# Patient Record
Sex: FEMALE | Race: WHITE | NOT HISPANIC OR LATINO | Employment: OTHER | URBAN - METROPOLITAN AREA
[De-identification: names, ages, dates, MRNs, and addresses within clinical notes are randomized per-mention and may not be internally consistent; named-entity substitution may affect disease eponyms.]

---

## 2017-02-01 ENCOUNTER — HOSPITAL ENCOUNTER (OUTPATIENT)
Dept: FAMILY MEDICINE CLINIC | Facility: CLINIC | Age: 57
Setting detail: SPECIMEN
Discharge: HOME | End: 2017-02-01
Attending: FAMILY MEDICINE | Admitting: FAMILY MEDICINE

## 2017-02-01 LAB
ALBUMIN SERPL-MCNC: 4.2 G/DL (ref 3.5–4.8)
ALBUMIN/GLOB SERPL: 1.5 {RATIO} (ref 1–1.7)
ALP SERPL-CCNC: 70 IU/L (ref 32–91)
ALT SERPL-CCNC: 35 IU/L (ref 14–54)
ANION GAP SERPL CALC-SCNC: 15.1 MMOL/L (ref 10–20)
AST SERPL-CCNC: 25 IU/L (ref 15–41)
BILIRUB SERPL-MCNC: 0.9 MG/DL (ref 0.3–1.2)
BUN SERPL-MCNC: 11 MG/DL (ref 8–20)
BUN/CREAT SERPL: 15.7 (ref 5.4–26.2)
CALCIUM SERPL-MCNC: 9.8 MG/DL (ref 8.9–10.3)
CHLORIDE SERPL-SCNC: 105 MMOL/L (ref 101–111)
CHOLEST SERPL-MCNC: 241 MG/DL
CHOLEST/HDLC SERPL: 5.8 {RATIO}
CONV CO2: 25 MMOL/L (ref 22–32)
CONV LDL CHOLESTEROL DIRECT: 171 MG/DL (ref 0–100)
CONV TOTAL PROTEIN: 7 G/DL (ref 6.1–7.9)
CREAT UR-MCNC: 0.7 MG/DL (ref 0.4–1)
GLOBULIN UR ELPH-MCNC: 2.8 G/DL (ref 2.5–3.8)
GLUCOSE SERPL-MCNC: 95 MG/DL (ref 65–99)
HDLC SERPL-MCNC: 42 MG/DL
LDLC/HDLC SERPL: 4.1 {RATIO}
LIPID INTERPRETATION: ABNORMAL
POTASSIUM SERPL-SCNC: 4.1 MMOL/L (ref 3.6–5.1)
SODIUM SERPL-SCNC: 141 MMOL/L (ref 136–144)
TRIGL SERPL-MCNC: 129 MG/DL
VLDLC SERPL CALC-MCNC: 28 MG/DL

## 2017-09-01 ENCOUNTER — HOSPITAL ENCOUNTER (OUTPATIENT)
Dept: FAMILY MEDICINE CLINIC | Facility: CLINIC | Age: 57
Setting detail: SPECIMEN
Discharge: HOME | End: 2017-09-01
Attending: FAMILY MEDICINE | Admitting: FAMILY MEDICINE

## 2018-03-30 ENCOUNTER — HOSPITAL ENCOUNTER (OUTPATIENT)
Dept: FAMILY MEDICINE CLINIC | Facility: CLINIC | Age: 58
Setting detail: SPECIMEN
Discharge: HOME | End: 2018-03-30
Attending: FAMILY MEDICINE | Admitting: FAMILY MEDICINE

## 2018-09-28 ENCOUNTER — HOSPITAL ENCOUNTER (OUTPATIENT)
Dept: FAMILY MEDICINE CLINIC | Facility: CLINIC | Age: 58
Setting detail: SPECIMEN
Discharge: HOME | End: 2018-09-28
Attending: FAMILY MEDICINE | Admitting: FAMILY MEDICINE

## 2018-09-28 LAB
ALBUMIN SERPL-MCNC: 4.1 G/DL (ref 3.5–4.8)
ALBUMIN/GLOB SERPL: 1.5 {RATIO} (ref 1–1.7)
ALP SERPL-CCNC: 88 IU/L (ref 32–91)
ALT SERPL-CCNC: 46 IU/L (ref 14–54)
ANION GAP SERPL CALC-SCNC: 12.7 MMOL/L (ref 10–20)
AST SERPL-CCNC: 34 IU/L (ref 15–41)
BILIRUB SERPL-MCNC: 0.9 MG/DL (ref 0.3–1.2)
BUN SERPL-MCNC: 14 MG/DL (ref 8–20)
BUN/CREAT SERPL: 23.3 (ref 5.4–26.2)
CALCIUM SERPL-MCNC: 9.5 MG/DL (ref 8.9–10.3)
CHLORIDE SERPL-SCNC: 107 MMOL/L (ref 101–111)
CHOLEST SERPL-MCNC: 187 MG/DL
CHOLEST/HDLC SERPL: 4 {RATIO}
CONV CO2: 25 MMOL/L (ref 22–32)
CONV LDL CHOLESTEROL DIRECT: 111 MG/DL (ref 0–100)
CONV TOTAL PROTEIN: 6.8 G/DL (ref 6.1–7.9)
CREAT UR-MCNC: 0.6 MG/DL (ref 0.4–1)
GLOBULIN UR ELPH-MCNC: 2.7 G/DL (ref 2.5–3.8)
GLUCOSE SERPL-MCNC: 95 MG/DL (ref 65–99)
HDLC SERPL-MCNC: 47 MG/DL
LDLC/HDLC SERPL: 2.4 {RATIO}
LIPID INTERPRETATION: ABNORMAL
POTASSIUM SERPL-SCNC: 3.7 MMOL/L (ref 3.6–5.1)
SODIUM SERPL-SCNC: 141 MMOL/L (ref 136–144)
TRIGL SERPL-MCNC: 182 MG/DL
VLDLC SERPL CALC-MCNC: 29 MG/DL

## 2019-04-05 ENCOUNTER — HOSPITAL ENCOUNTER (OUTPATIENT)
Dept: FAMILY MEDICINE CLINIC | Facility: CLINIC | Age: 59
Setting detail: SPECIMEN
Discharge: HOME | End: 2019-04-05
Attending: FAMILY MEDICINE | Admitting: FAMILY MEDICINE

## 2019-04-05 LAB
ALBUMIN SERPL-MCNC: 4 G/DL (ref 3.5–4.8)
ALBUMIN/GLOB SERPL: 1.4 {RATIO} (ref 1–1.7)
ALP SERPL-CCNC: 76 IU/L (ref 32–91)
ALT SERPL-CCNC: 40 IU/L (ref 14–54)
ANION GAP SERPL CALC-SCNC: 11.8 MMOL/L (ref 10–20)
AST SERPL-CCNC: 31 IU/L (ref 15–41)
BILIRUB SERPL-MCNC: 0.7 MG/DL (ref 0.3–1.2)
BUN SERPL-MCNC: 14 MG/DL (ref 8–20)
BUN/CREAT SERPL: 20 (ref 5.4–26.2)
CALCIUM SERPL-MCNC: 9.3 MG/DL (ref 8.9–10.3)
CHLORIDE SERPL-SCNC: 107 MMOL/L (ref 101–111)
CHOLEST SERPL-MCNC: 163 MG/DL
CHOLEST/HDLC SERPL: 4.2 {RATIO}
CONV CO2: 24 MMOL/L (ref 22–32)
CONV LDL CHOLESTEROL DIRECT: 93 MG/DL (ref 0–100)
CONV TOTAL PROTEIN: 6.9 G/DL (ref 6.1–7.9)
CREAT UR-MCNC: 0.7 MG/DL (ref 0.4–1)
GLOBULIN UR ELPH-MCNC: 2.9 G/DL (ref 2.5–3.8)
GLUCOSE SERPL-MCNC: 99 MG/DL (ref 65–99)
HDLC SERPL-MCNC: 39 MG/DL
LDLC/HDLC SERPL: 2.4 {RATIO}
LIPID INTERPRETATION: ABNORMAL
POTASSIUM SERPL-SCNC: 3.8 MMOL/L (ref 3.6–5.1)
SODIUM SERPL-SCNC: 139 MMOL/L (ref 136–144)
TRIGL SERPL-MCNC: 138 MG/DL
VLDLC SERPL CALC-MCNC: 31.1 MG/DL

## 2019-09-26 RX ORDER — AMLODIPINE BESYLATE 5 MG/1
TABLET ORAL
Qty: 90 TABLET | Refills: 1 | Status: SHIPPED | OUTPATIENT
Start: 2019-09-26 | End: 2020-04-03

## 2019-09-30 ENCOUNTER — TELEPHONE (OUTPATIENT)
Dept: FAMILY MEDICINE CLINIC | Facility: CLINIC | Age: 59
End: 2019-09-30

## 2019-09-30 RX ORDER — MELOXICAM 15 MG/1
15 TABLET ORAL DAILY
Qty: 90 TABLET | Refills: 1 | Status: SHIPPED | OUTPATIENT
Start: 2019-09-30 | End: 2019-11-06 | Stop reason: SDUPTHER

## 2019-11-06 ENCOUNTER — LAB (OUTPATIENT)
Dept: FAMILY MEDICINE CLINIC | Facility: CLINIC | Age: 59
End: 2019-11-06

## 2019-11-06 ENCOUNTER — OFFICE VISIT (OUTPATIENT)
Dept: FAMILY MEDICINE CLINIC | Facility: CLINIC | Age: 59
End: 2019-11-06

## 2019-11-06 VITALS
HEIGHT: 65 IN | HEART RATE: 87 BPM | DIASTOLIC BLOOD PRESSURE: 84 MMHG | OXYGEN SATURATION: 96 % | WEIGHT: 198 LBS | BODY MASS INDEX: 32.99 KG/M2 | SYSTOLIC BLOOD PRESSURE: 132 MMHG

## 2019-11-06 DIAGNOSIS — Z12.11 SCREENING FOR COLON CANCER: ICD-10-CM

## 2019-11-06 DIAGNOSIS — Z12.31 BREAST CANCER SCREENING BY MAMMOGRAM: ICD-10-CM

## 2019-11-06 DIAGNOSIS — Z12.4 PAP SMEAR FOR CERVICAL CANCER SCREENING: ICD-10-CM

## 2019-11-06 DIAGNOSIS — Z00.00 PREVENTATIVE HEALTH CARE: Primary | ICD-10-CM

## 2019-11-06 DIAGNOSIS — Z00.00 PREVENTATIVE HEALTH CARE: ICD-10-CM

## 2019-11-06 PROBLEM — I10 HYPERTENSION: Status: ACTIVE | Noted: 2019-11-06

## 2019-11-06 PROBLEM — M19.90 ARTHRITIS: Status: ACTIVE | Noted: 2019-11-06

## 2019-11-06 LAB
ALBUMIN SERPL-MCNC: 4.5 G/DL (ref 3.5–5.2)
ALBUMIN/GLOB SERPL: 1.6 G/DL
ALP SERPL-CCNC: 80 U/L (ref 39–117)
ALT SERPL W P-5'-P-CCNC: 30 U/L (ref 1–33)
ANION GAP SERPL CALCULATED.3IONS-SCNC: 11.7 MMOL/L (ref 5–15)
AST SERPL-CCNC: 17 U/L (ref 1–32)
BILIRUB BLD-MCNC: NEGATIVE MG/DL
BILIRUB SERPL-MCNC: 0.3 MG/DL (ref 0.2–1.2)
BUN BLD-MCNC: 12 MG/DL (ref 8–23)
BUN/CREAT SERPL: 18.5 (ref 7–25)
CALCIUM SPEC-SCNC: 9.9 MG/DL (ref 8.6–10.5)
CHLORIDE SERPL-SCNC: 101 MMOL/L (ref 98–107)
CHOLEST SERPL-MCNC: 272 MG/DL (ref 0–200)
CLARITY, POC: CLEAR
CO2 SERPL-SCNC: 26.3 MMOL/L (ref 22–29)
COLOR UR: YELLOW
CREAT BLD-MCNC: 0.65 MG/DL (ref 0.57–1)
GFR SERPL CREATININE-BSD FRML MDRD: 93 ML/MIN/1.73
GLOBULIN UR ELPH-MCNC: 2.9 GM/DL
GLUCOSE BLD-MCNC: 106 MG/DL (ref 65–99)
GLUCOSE UR STRIP-MCNC: NEGATIVE MG/DL
HBA1C MFR BLD: 5.2 % (ref 3.5–5.6)
HDLC SERPL-MCNC: 46 MG/DL (ref 40–60)
HEMOCCULT STL QL IA: NEGATIVE
KETONES UR QL: NEGATIVE
LDLC SERPL CALC-MCNC: 195 MG/DL (ref 0–100)
LDLC/HDLC SERPL: 4.23 {RATIO}
LEUKOCYTE EST, POC: NEGATIVE
NITRITE UR-MCNC: NEGATIVE MG/ML
PH UR: 6 [PH] (ref 5–8)
POTASSIUM BLD-SCNC: 4.3 MMOL/L (ref 3.5–5.2)
PROT SERPL-MCNC: 7.4 G/DL (ref 6–8.5)
PROT UR STRIP-MCNC: NEGATIVE MG/DL
RBC # UR STRIP: NEGATIVE /UL
SODIUM BLD-SCNC: 139 MMOL/L (ref 136–145)
SP GR UR: 1.01 (ref 1–1.03)
TRIGL SERPL-MCNC: 156 MG/DL (ref 0–150)
UROBILINOGEN UR QL: NORMAL
VLDLC SERPL-MCNC: 31.2 MG/DL (ref 5–40)

## 2019-11-06 PROCEDURE — 80061 LIPID PANEL: CPT | Performed by: FAMILY MEDICINE

## 2019-11-06 PROCEDURE — 80053 COMPREHEN METABOLIC PANEL: CPT | Performed by: FAMILY MEDICINE

## 2019-11-06 PROCEDURE — 81003 URINALYSIS AUTO W/O SCOPE: CPT | Performed by: FAMILY MEDICINE

## 2019-11-06 PROCEDURE — 36415 COLL VENOUS BLD VENIPUNCTURE: CPT | Performed by: FAMILY MEDICINE

## 2019-11-06 PROCEDURE — 99396 PREV VISIT EST AGE 40-64: CPT | Performed by: FAMILY MEDICINE

## 2019-11-06 PROCEDURE — 82274 ASSAY TEST FOR BLOOD FECAL: CPT | Performed by: FAMILY MEDICINE

## 2019-11-06 PROCEDURE — 83036 HEMOGLOBIN GLYCOSYLATED A1C: CPT | Performed by: FAMILY MEDICINE

## 2019-11-06 RX ORDER — VITAMIN B COMPLEX
CAPSULE ORAL DAILY
COMMUNITY
End: 2022-09-19

## 2019-11-06 RX ORDER — AMOXICILLIN AND CLAVULANATE POTASSIUM 875; 125 MG/1; MG/1
1 TABLET, FILM COATED ORAL 2 TIMES DAILY
Qty: 20 TABLET | Refills: 0 | Status: SHIPPED | OUTPATIENT
Start: 2019-11-06 | End: 2020-06-08 | Stop reason: SDUPTHER

## 2019-11-06 RX ORDER — MELOXICAM 15 MG/1
15 TABLET ORAL DAILY
Qty: 90 TABLET | Refills: 1 | Status: SHIPPED | OUTPATIENT
Start: 2019-11-06 | End: 2020-11-08

## 2019-11-06 NOTE — PROGRESS NOTES
Subjective   Rosalia Gamino is a 59 y.o. female.     She is here for cpe and pap smear  2 weeks of sinus pressure  Cough  Had flu exposure and symptoms and was treated at a [facility]  Refuses flu vaccine  UTD on tdap  Last colonoscopy was 2012  FH colon cancer so she is rec to do every five         The following portions of the patient's history were reviewed and updated as appropriate: allergies, current medications, past family history, past medical history, past social history, past surgical history and problem list.  Past Medical History:   Diagnosis Date   • Arthritis      Past Surgical History:   Procedure Laterality Date   • ROTATOR CUFF REPAIR Right      History reviewed. No pertinent family history.  Social History     Socioeconomic History   • Marital status:      Spouse name: Not on file   • Number of children: Not on file   • Years of education: Not on file   • Highest education level: Not on file   Tobacco Use   • Smoking status: Former Smoker   • Smokeless tobacco: Never Used   • Tobacco comment: occasionally         Current Outpatient Medications:   •  amLODIPine (NORVASC) 5 MG tablet, TAKE 1 TABLET BY MOUTH EVERY DAY FOR BLOOD PRESSURE, Disp: 90 tablet, Rfl: 1  •  B Complex Vitamins (VITAMIN B COMPLEX) capsule capsule, Take  by mouth Daily., Disp: , Rfl:   •  Calcium Carb-Cholecalciferol 1000-800 MG-UNIT tablet, CALCIUM + D3 TABS, Disp: , Rfl:   •  Lactobacillus Rhamnosus, GG, ([employer] PROBIOTIC, LACTOBACILLUS,) capsule, [employer]S PROBIOTIC (LACTOBACILLUS) CAPS, Disp: , Rfl:   •  Magnesium (M2 MAGNESIUM) 100 MG capsule, M2 MAGNESIUM 100 MG CAPS, Disp: , Rfl:   •  meloxicam (MOBIC) 15 MG tablet, Take 1 tablet by mouth Daily., Disp: 90 tablet, Rfl: 1  •  Milk Thistle-Turmeric (SILYMARIN PO), SILYMARIN CAPS, Disp: , Rfl:   •  MULTIPLE VITAMIN-FOLIC ACID PO, MULTIVITAMINS TABS, Disp: , Rfl:   •  amoxicillin-clavulanate (AUGMENTIN) 875-125 MG per tablet, Take 1 tablet by mouth 2 (Two) Times a Day., Disp: 20  "tablet, Rfl: 0  •  benzonatate (TESSALON) 200 MG capsule, 1 capsule p.o. every 8 hours as needed cough, Disp: 21 capsule, Rfl: 0    Review of Systems   Constitutional: Negative.    HENT: Positive for congestion and sinus pressure.    Eyes: Negative.    Respiratory: Positive for cough. Negative for apnea, choking, chest tightness, shortness of breath, wheezing and stridor.    Cardiovascular: Negative.    Gastrointestinal: Negative.    Endocrine: Negative.    Genitourinary: Negative.    Musculoskeletal: Positive for arthralgias.   Skin: Negative.    Allergic/Immunologic: Negative.    Neurological: Negative.    Hematological: Negative.    Psychiatric/Behavioral: Negative.      /84   Pulse 87   Ht 165.1 cm (65\")   Wt 89.8 kg (198 lb)   SpO2 96%   Breastfeeding? No   BMI 32.95 kg/m²       Objective   Physical Exam   Constitutional: She is oriented to person, place, and time. She appears well-developed and well-nourished. No distress. She is obese.  HENT:   Head: Normocephalic and atraumatic.   Right Ear: Hearing, tympanic membrane, external ear and ear canal normal.   Left Ear: Hearing, tympanic membrane, external ear and ear canal normal.   Nose: Nose normal. Right sinus exhibits no maxillary sinus tenderness and no frontal sinus tenderness. Left sinus exhibits no maxillary sinus tenderness and no frontal sinus tenderness.   Mouth/Throat: Uvula is midline, oropharynx is clear and moist and mucous membranes are normal.   Eyes: Conjunctivae, EOM and lids are normal. Pupils are equal, round, and reactive to light. Right eye exhibits no discharge. Left eye exhibits no discharge. No scleral icterus.   Neck: Trachea normal, full passive range of motion without pain and phonation normal. Neck supple. No JVD present. Carotid bruit is not present. No thyromegaly present.   Cardiovascular: Normal rate, regular rhythm, normal heart sounds, intact distal pulses and normal pulses. Exam reveals no gallop and no friction " rub.   No murmur heard.  Pulmonary/Chest: Effort normal and breath sounds normal. No respiratory distress. She has no wheezes. She has no rhonchi. She has no rales. Right breast exhibits no inverted nipple, no mass, no nipple discharge, no skin change and no tenderness. Left breast exhibits no inverted nipple, no mass, no nipple discharge, no skin change and no tenderness. Breasts are symmetrical. There is no breast swelling.   Abdominal: Soft. Bowel sounds are normal. She exhibits no mass. There is no hepatosplenomegaly. There is no tenderness. No hernia. Hernia confirmed negative in the right inguinal area and confirmed negative in the left inguinal area.   Genitourinary: Vagina normal and uterus normal. Rectal exam shows guaiac negative stool. Pelvic exam was performed with patient supine. There is no rash, tenderness, lesion, injury or Bartholin's cyst on the right labia. There is no rash, lesion, injury or Bartholin's cyst on the left labia.   Musculoskeletal: Normal range of motion. She exhibits no edema.   Lymphadenopathy:     She has no cervical adenopathy.     She has no axillary adenopathy.        Right: No inguinal and no supraclavicular adenopathy present.        Left: No inguinal and no supraclavicular adenopathy present.   Neurological: She is alert and oriented to person, place, and time. She has normal strength and normal reflexes. She displays normal reflexes. No cranial nerve deficit or sensory deficit. She exhibits normal muscle tone. Coordination normal.   Skin: Skin is warm and dry. Turgor is normal. No rash noted.   Psychiatric: She has a normal mood and affect. Her speech is normal and behavior is normal. Judgment and thought content normal. Cognition and memory are normal.   Nursing note and vitals reviewed.    Brief Urine Lab Results  (Last result in the past 365 days)      Color   Clarity   Blood   Leuk Est   Nitrite   Protein   CREAT   Urine HCG        11/06/19 1006 Yellow Clear Negative  Negative Negative Negative           Findings; occult blood negative/positive: negative    Assessment/Plan   Problems Addressed this Visit     None      Visit Diagnoses     Preventative health care    -  Primary    Relevant Orders    Comprehensive Metabolic Panel    Hemoglobin A1c    Lipid Panel    POCT urinalysis dipstick, automated (Completed)    Breast cancer screening by mammogram        Relevant Orders    Mammo Screening Digital Tomosynthesis Bilateral With CAD    Pap smear for cervical cancer screening        Relevant Orders    PapIG HPV Age Gdln ACOG    Screening for colon cancer        Relevant Orders    POC FECAL OCCULT BLOOD BY IMMUNOASSAY (Completed)          Counseled on the need for weight loss through increased exercise and improved diet  She is UTD on tdap; due colonoscopy; mammogram ordered  Encouraged shingriix  Pap smear sent  Has sinus infection - rx augmentin

## 2019-11-06 NOTE — PATIENT INSTRUCTIONS
SHINGRIX - 2 shot series - talk to your pharmacist  Keep working to lose weight through healthy eating and exercise.  Call when ready to schedule your colonoscopy  Extra rest and fluids   Limit dairy for a few days

## 2019-11-12 LAB
AGE GDLN ACOG TESTING: NORMAL
CYTOLOGIST CVX/VAG CYTO: NORMAL
CYTOLOGY CVX/VAG DOC CYTO: NORMAL
CYTOLOGY CVX/VAG DOC THIN PREP: NORMAL
DX ICD CODE: NORMAL
HIV 1 & 2 AB SER-IMP: NORMAL
HPV I/H RISK 4 DNA CVX QL PROBE+SIG AMP: NEGATIVE
OTHER STN SPEC: NORMAL
STAT OF ADQ CVX/VAG CYTO-IMP: NORMAL

## 2019-11-30 ENCOUNTER — HOSPITAL ENCOUNTER (OUTPATIENT)
Dept: CARDIOLOGY | Facility: HOSPITAL | Age: 59
Discharge: HOME | End: 2019-11-30

## 2019-11-30 ENCOUNTER — LAB (OUTPATIENT)
Dept: LAB | Facility: HOSPITAL | Age: 59
End: 2019-11-30

## 2019-11-30 ENCOUNTER — HOSPITAL ENCOUNTER (OUTPATIENT)
Dept: GENERAL RADIOLOGY | Facility: HOSPITAL | Age: 59
Discharge: HOME | End: 2019-11-30
Admitting: PODIATRIST

## 2019-11-30 ENCOUNTER — TRANSCRIBE ORDERS (OUTPATIENT)
Dept: ADMINISTRATIVE | Facility: HOSPITAL | Age: 59
End: 2019-11-30

## 2019-11-30 DIAGNOSIS — Z01.818 PRE-OP TESTING: Primary | ICD-10-CM

## 2019-11-30 DIAGNOSIS — Z01.818 PRE-OP TESTING: ICD-10-CM

## 2019-11-30 LAB
ANION GAP SERPL CALCULATED.3IONS-SCNC: 13.2 MMOL/L (ref 5–15)
BASOPHILS # BLD AUTO: 0.08 10*3/MM3 (ref 0–0.2)
BASOPHILS NFR BLD AUTO: 1 % (ref 0–1.5)
BUN BLD-MCNC: 14 MG/DL (ref 8–23)
BUN/CREAT SERPL: 21.2 (ref 7–25)
CALCIUM SPEC-SCNC: 9.8 MG/DL (ref 8.6–10.5)
CHLORIDE SERPL-SCNC: 107 MMOL/L (ref 98–107)
CO2 SERPL-SCNC: 24.8 MMOL/L (ref 22–29)
CREAT BLD-MCNC: 0.66 MG/DL (ref 0.57–1)
DEPRECATED RDW RBC AUTO: 44.2 FL (ref 37–54)
EOSINOPHIL # BLD AUTO: 0 10*3/MM3 (ref 0–0.4)
EOSINOPHIL NFR BLD AUTO: 0 % (ref 0.3–6.2)
ERYTHROCYTE [DISTWIDTH] IN BLOOD BY AUTOMATED COUNT: 12.6 % (ref 12.3–15.4)
GFR SERPL CREATININE-BSD FRML MDRD: 91 ML/MIN/1.73
GLUCOSE BLD-MCNC: 102 MG/DL (ref 65–99)
HCT VFR BLD AUTO: 42.5 % (ref 34–46.6)
HGB BLD-MCNC: 14.4 G/DL (ref 12–15.9)
IMM GRANULOCYTES # BLD AUTO: 0.04 10*3/MM3 (ref 0–0.05)
IMM GRANULOCYTES NFR BLD AUTO: 0.5 % (ref 0–0.5)
INR PPP: 1.04 (ref 0.9–1.1)
LYMPHOCYTES # BLD AUTO: 2.91 10*3/MM3 (ref 0.7–3.1)
LYMPHOCYTES NFR BLD AUTO: 34.6 % (ref 19.6–45.3)
MCH RBC QN AUTO: 32.3 PG (ref 26.6–33)
MCHC RBC AUTO-ENTMCNC: 33.9 G/DL (ref 31.5–35.7)
MCV RBC AUTO: 95.3 FL (ref 79–97)
MONOCYTES # BLD AUTO: 0.65 10*3/MM3 (ref 0.1–0.9)
MONOCYTES NFR BLD AUTO: 7.7 % (ref 5–12)
NEUTROPHILS # BLD AUTO: 4.73 10*3/MM3 (ref 1.7–7)
NEUTROPHILS NFR BLD AUTO: 56.2 % (ref 42.7–76)
NRBC BLD AUTO-RTO: 0 /100 WBC (ref 0–0.2)
PLATELET # BLD AUTO: 321 10*3/MM3 (ref 140–450)
PMV BLD AUTO: 10.3 FL (ref 6–12)
POTASSIUM BLD-SCNC: 4.6 MMOL/L (ref 3.5–5.2)
PROTHROMBIN TIME: 10.9 SECONDS (ref 9.6–11.7)
RBC # BLD AUTO: 4.46 10*6/MM3 (ref 3.77–5.28)
SODIUM BLD-SCNC: 145 MMOL/L (ref 136–145)
WBC NRBC COR # BLD: 8.41 10*3/MM3 (ref 3.4–10.8)

## 2019-11-30 PROCEDURE — 80048 BASIC METABOLIC PNL TOTAL CA: CPT

## 2019-11-30 PROCEDURE — 71046 X-RAY EXAM CHEST 2 VIEWS: CPT

## 2019-11-30 PROCEDURE — 85025 COMPLETE CBC W/AUTO DIFF WBC: CPT

## 2019-11-30 PROCEDURE — 85610 PROTHROMBIN TIME: CPT

## 2019-11-30 PROCEDURE — 36415 COLL VENOUS BLD VENIPUNCTURE: CPT

## 2019-11-30 PROCEDURE — 93005 ELECTROCARDIOGRAM TRACING: CPT | Performed by: PODIATRIST

## 2019-12-06 ENCOUNTER — LAB REQUISITION (OUTPATIENT)
Dept: LAB | Facility: HOSPITAL | Age: 59
End: 2019-12-06

## 2019-12-06 DIAGNOSIS — M19.071 PRIMARY OSTEOARTHRITIS, RIGHT ANKLE AND FOOT: ICD-10-CM

## 2019-12-06 DIAGNOSIS — D16.31 BENIGN NEOPLASM OF SHORT BONES OF RIGHT LOWER LIMB: ICD-10-CM

## 2019-12-06 PROCEDURE — 88305 TISSUE EXAM BY PATHOLOGIST: CPT | Performed by: PODIATRIST

## 2019-12-06 PROCEDURE — 88311 DECALCIFY TISSUE: CPT | Performed by: PODIATRIST

## 2019-12-08 PROCEDURE — 93010 ELECTROCARDIOGRAM REPORT: CPT | Performed by: INTERNAL MEDICINE

## 2019-12-09 LAB
LAB AP CASE REPORT: NORMAL
PATH REPORT.FINAL DX SPEC: NORMAL
PATH REPORT.GROSS SPEC: NORMAL

## 2020-04-03 RX ORDER — AMLODIPINE BESYLATE 5 MG/1
TABLET ORAL
Qty: 90 TABLET | Refills: 2 | Status: SHIPPED | OUTPATIENT
Start: 2020-04-03 | End: 2021-01-10

## 2020-06-08 ENCOUNTER — TELEPHONE (OUTPATIENT)
Dept: FAMILY MEDICINE CLINIC | Facility: CLINIC | Age: 60
End: 2020-06-08

## 2020-06-08 RX ORDER — AMOXICILLIN AND CLAVULANATE POTASSIUM 875; 125 MG/1; MG/1
1 TABLET, FILM COATED ORAL 2 TIMES DAILY
Qty: 20 TABLET | Refills: 0 | Status: SHIPPED | OUTPATIENT
Start: 2020-06-08 | End: 2021-01-07 | Stop reason: SDUPTHER

## 2020-06-08 NOTE — TELEPHONE ENCOUNTER
Pt requesting rx for her semi annual sinus infection. She has been taking otc allergy meds and it has not helped.

## 2020-09-18 ENCOUNTER — TELEPHONE (OUTPATIENT)
Dept: FAMILY MEDICINE CLINIC | Facility: CLINIC | Age: 60
End: 2020-09-18

## 2020-09-18 NOTE — TELEPHONE ENCOUNTER
Pt tried to make a cpe apt, but you are booked out until 2021. She had 1 day recently where her bp was 174/114. It has not happened since then, but she wants to know if she needs an rx or need to be seen.

## 2020-09-20 NOTE — PROGRESS NOTES
"Subjective   Rosalia Gamino is a 60 y.o. female.     Pt is here today with c/o HTN.  Pt typically sees Dr. Julian Glynn.  She reports that she has recently had on episode of an elevated BP.  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  Her  is also fighting liver cancer.  She had right ankle surgery in December and hasn't been able to exercise like she normally does.  She is currently on norvasc 5mg daily.  She believes that the elevation in BP is due to stress.  She does not have a machine at home.  Denies CP, SOA, dizziness, or HA.  She is not wanting to get on anything for her anxiety.  She does state that she is sensitive to caffeine and she has been drinking more caffeine.  She is hoping to retire soon.        The following portions of the patient's history were reviewed and updated as appropriate: allergies, current medications, past family history, past medical history, past social history, past surgical history and problem list.    Review of Systems   Constitutional: Negative for chills, fatigue and fever.   Eyes: Negative for blurred vision and double vision.   Respiratory: Negative for chest tightness and shortness of breath.    Cardiovascular: Negative for chest pain and palpitations.   Gastrointestinal: Negative for abdominal pain, constipation, diarrhea, nausea and vomiting.   Neurological: Negative for dizziness and headache.   Psychiatric/Behavioral: Negative for self-injury, suicidal ideas, depressed mood and stress. The patient is nervous/anxious.        Objective   /87   Pulse 77   Temp 97.3 °F (36.3 °C) (Temporal)   Ht 165.1 cm (65\")   Wt 88.5 kg (195 lb)   SpO2 96%   BMI 32.45 kg/m²   Physical Exam  Constitutional:       Appearance: Normal appearance.   HENT:      Head: Normocephalic and atraumatic.   Neck:      Musculoskeletal: Normal range of motion.   Cardiovascular:      Rate and Rhythm: Normal rate and regular rhythm.   Pulmonary: "      Effort: Pulmonary effort is normal.      Breath sounds: Normal breath sounds.   Musculoskeletal: Normal range of motion.   Neurological:      General: No focal deficit present.      Mental Status: She is alert and oriented to person, place, and time.   Psychiatric:         Mood and Affect: Mood normal.         Behavior: Behavior normal.         Thought Content: Thought content normal.         Judgment: Judgment normal.           Assessment/Plan     Diagnoses and all orders for this visit:    1. Hypertension, unspecified type (Primary)  Comments:  reading came down after sitting for a couple minutes.  no med changes at this time  cont [facility]  check labs  monitor BP at home  Orders:  -     Comprehensive Metabolic Panel; Future  -     CBC & Differential  -     Lipid Panel; Future

## 2020-09-22 ENCOUNTER — OFFICE VISIT (OUTPATIENT)
Dept: FAMILY MEDICINE CLINIC | Facility: CLINIC | Age: 60
End: 2020-09-22

## 2020-09-22 ENCOUNTER — LAB (OUTPATIENT)
Dept: LAB | Facility: HOSPITAL | Age: 60
End: 2020-09-22

## 2020-09-22 VITALS
HEART RATE: 77 BPM | DIASTOLIC BLOOD PRESSURE: 87 MMHG | OXYGEN SATURATION: 96 % | HEIGHT: 65 IN | TEMPERATURE: 97.3 F | WEIGHT: 195 LBS | BODY MASS INDEX: 32.49 KG/M2 | SYSTOLIC BLOOD PRESSURE: 129 MMHG

## 2020-09-22 DIAGNOSIS — I10 HYPERTENSION, UNSPECIFIED TYPE: Primary | ICD-10-CM

## 2020-09-22 DIAGNOSIS — I10 HYPERTENSION, UNSPECIFIED TYPE: ICD-10-CM

## 2020-09-22 LAB
ALBUMIN SERPL-MCNC: 4.2 G/DL (ref 3.5–5.2)
ALBUMIN/GLOB SERPL: 1.6 G/DL
ALP SERPL-CCNC: 81 U/L (ref 39–117)
ALT SERPL W P-5'-P-CCNC: 33 U/L (ref 1–33)
ANION GAP SERPL CALCULATED.3IONS-SCNC: 7.7 MMOL/L (ref 5–15)
AST SERPL-CCNC: 21 U/L (ref 1–32)
BASOPHILS # BLD AUTO: 0.05 10*3/MM3 (ref 0–0.2)
BASOPHILS NFR BLD AUTO: 0.7 % (ref 0–1.5)
BILIRUB SERPL-MCNC: 0.4 MG/DL (ref 0–1.2)
BUN SERPL-MCNC: 13 MG/DL (ref 8–23)
BUN/CREAT SERPL: 20.3 (ref 7–25)
CALCIUM SPEC-SCNC: 9.8 MG/DL (ref 8.6–10.5)
CHLORIDE SERPL-SCNC: 106 MMOL/L (ref 98–107)
CHOLEST SERPL-MCNC: 235 MG/DL (ref 0–200)
CO2 SERPL-SCNC: 27.3 MMOL/L (ref 22–29)
CREAT SERPL-MCNC: 0.64 MG/DL (ref 0.57–1)
DEPRECATED RDW RBC AUTO: 43.3 FL (ref 37–54)
EOSINOPHIL # BLD AUTO: 0 10*3/MM3 (ref 0–0.4)
EOSINOPHIL NFR BLD AUTO: 0 % (ref 0.3–6.2)
ERYTHROCYTE [DISTWIDTH] IN BLOOD BY AUTOMATED COUNT: 12.8 % (ref 12.3–15.4)
GFR SERPL CREATININE-BSD FRML MDRD: 94 ML/MIN/1.73
GLOBULIN UR ELPH-MCNC: 2.6 GM/DL
GLUCOSE SERPL-MCNC: 102 MG/DL (ref 65–99)
HCT VFR BLD AUTO: 43 % (ref 34–46.6)
HDLC SERPL-MCNC: 45 MG/DL (ref 40–60)
HGB BLD-MCNC: 14.5 G/DL (ref 12–15.9)
IMM GRANULOCYTES # BLD AUTO: 0.03 10*3/MM3 (ref 0–0.05)
IMM GRANULOCYTES NFR BLD AUTO: 0.4 % (ref 0–0.5)
LDLC SERPL CALC-MCNC: 164 MG/DL (ref 0–100)
LDLC/HDLC SERPL: 3.64 {RATIO}
LYMPHOCYTES # BLD AUTO: 2.68 10*3/MM3 (ref 0.7–3.1)
LYMPHOCYTES NFR BLD AUTO: 39.6 % (ref 19.6–45.3)
MCH RBC QN AUTO: 31.5 PG (ref 26.6–33)
MCHC RBC AUTO-ENTMCNC: 33.7 G/DL (ref 31.5–35.7)
MCV RBC AUTO: 93.3 FL (ref 79–97)
MONOCYTES # BLD AUTO: 0.62 10*3/MM3 (ref 0.1–0.9)
MONOCYTES NFR BLD AUTO: 9.2 % (ref 5–12)
NEUTROPHILS NFR BLD AUTO: 3.38 10*3/MM3 (ref 1.7–7)
NEUTROPHILS NFR BLD AUTO: 50.1 % (ref 42.7–76)
NRBC BLD AUTO-RTO: 0 /100 WBC (ref 0–0.2)
PLATELET # BLD AUTO: 323 10*3/MM3 (ref 140–450)
PMV BLD AUTO: 9.8 FL (ref 6–12)
POTASSIUM SERPL-SCNC: 4.4 MMOL/L (ref 3.5–5.2)
PROT SERPL-MCNC: 6.8 G/DL (ref 6–8.5)
RBC # BLD AUTO: 4.61 10*6/MM3 (ref 3.77–5.28)
SODIUM SERPL-SCNC: 141 MMOL/L (ref 136–145)
TRIGL SERPL-MCNC: 131 MG/DL (ref 0–150)
VLDLC SERPL-MCNC: 26.2 MG/DL (ref 5–40)
WBC # BLD AUTO: 6.76 10*3/MM3 (ref 3.4–10.8)

## 2020-09-22 PROCEDURE — 80061 LIPID PANEL: CPT

## 2020-09-22 PROCEDURE — 80053 COMPREHEN METABOLIC PANEL: CPT

## 2020-09-22 PROCEDURE — 36415 COLL VENOUS BLD VENIPUNCTURE: CPT

## 2020-09-22 PROCEDURE — 85025 COMPLETE CBC W/AUTO DIFF WBC: CPT | Performed by: NURSE PRACTITIONER

## 2020-09-22 PROCEDURE — 99213 OFFICE O/P EST LOW 20 MIN: CPT | Performed by: NURSE PRACTITIONER

## 2020-09-22 NOTE — PATIENT INSTRUCTIONS
Continue norvasc  Work on diet and exercise  Monitor BP at home- goal is 120/80 (want below 140/90)  Message readings next week  Complete blood work  Call for any issues or concerns

## 2020-09-29 RX ORDER — LOSARTAN POTASSIUM 25 MG/1
25 TABLET ORAL DAILY
Qty: 30 TABLET | Refills: 0 | Status: SHIPPED | OUTPATIENT
Start: 2020-09-29 | End: 2020-10-25

## 2020-10-25 RX ORDER — LOSARTAN POTASSIUM 25 MG/1
TABLET ORAL
Qty: 30 TABLET | Refills: 0 | Status: SHIPPED | OUTPATIENT
Start: 2020-10-25 | End: 2020-10-27

## 2020-11-08 RX ORDER — MELOXICAM 15 MG/1
TABLET ORAL
Qty: 30 TABLET | Refills: 5 | Status: SHIPPED | OUTPATIENT
Start: 2020-11-08 | End: 2021-05-18

## 2021-01-07 ENCOUNTER — E-VISIT (OUTPATIENT)
Dept: FAMILY MEDICINE CLINIC | Facility: CLINIC | Age: 61
End: 2021-01-07

## 2021-01-07 DIAGNOSIS — J01.90 ACUTE SINUSITIS, RECURRENCE NOT SPECIFIED, UNSPECIFIED LOCATION: Primary | ICD-10-CM

## 2021-01-07 PROCEDURE — 99441 PR PHYS/QHP TELEPHONE EVALUATION 5-10 MIN: CPT | Performed by: FAMILY MEDICINE

## 2021-01-07 RX ORDER — AMOXICILLIN AND CLAVULANATE POTASSIUM 875; 125 MG/1; MG/1
1 TABLET, FILM COATED ORAL 2 TIMES DAILY
Qty: 20 TABLET | Refills: 0 | Status: SHIPPED | OUTPATIENT
Start: 2021-01-07 | End: 2021-01-14

## 2021-01-08 NOTE — PROGRESS NOTES
Rosalia Hongiscoe    1960  [id number]    I have reviewed the e-Visit questionnaire and patient's answers, my assessment and plan are as follows:    HPI  C/O month long h/o sinus congestion, drainage and pressure.  Denies fever.  Has tried, claritin, zyrtec, and allegra  As well as coricidin HBP  Minimal success  No sick contacts or covid exposure    Review of Systems - Negative except as above        Diagnoses and all orders for this visit:    1. Acute sinusitis, recurrence not specified, unspecified location (Primary)    Other orders  -     amoxicillin-clavulanate (Augmentin) 875-125 MG per tablet; Take 1 tablet by mouth 2 (Two) Times a Day.  Dispense: 20 tablet; Refill: 0        Any medications prescribed have been sent electronically to   [facility]/pharmacy #[id number] - [place], [place] - [address] - [phone]  - [phone] FX  [address] [place] [address]  Phone: [phone] Fax: [phone]        Karen Glynn MD  01/07/21  20:42 EST

## 2021-01-10 RX ORDER — AMLODIPINE BESYLATE 5 MG/1
TABLET ORAL
Qty: 90 TABLET | Refills: 0 | Status: SHIPPED | OUTPATIENT
Start: 2021-01-10 | End: 2021-04-08

## 2021-01-14 ENCOUNTER — TELEPHONE (OUTPATIENT)
Dept: FAMILY MEDICINE CLINIC | Facility: CLINIC | Age: 61
End: 2021-01-14

## 2021-01-14 RX ORDER — DOXYCYCLINE 100 MG/1
100 TABLET ORAL 2 TIMES DAILY
Qty: 20 TABLET | Refills: 0 | Status: SHIPPED | OUTPATIENT
Start: 2021-01-14 | End: 2022-09-19

## 2021-01-14 NOTE — TELEPHONE ENCOUNTER
Pt had e-visit and was given augmentin  A few days later developed hives  She was told to stop the augmentin and list pcn as an allergy  Will send new antibiotic

## 2021-04-08 RX ORDER — AMLODIPINE BESYLATE 5 MG/1
TABLET ORAL
Qty: 90 TABLET | Refills: 1 | Status: SHIPPED | OUTPATIENT
Start: 2021-04-08 | End: 2021-10-13

## 2021-05-18 RX ORDER — MELOXICAM 15 MG/1
TABLET ORAL
Qty: 90 TABLET | Refills: 0 | Status: SHIPPED | OUTPATIENT
Start: 2021-05-18 | End: 2021-07-16

## 2021-07-16 RX ORDER — MELOXICAM 15 MG/1
TABLET ORAL
Qty: 90 TABLET | Refills: 0 | Status: SHIPPED | OUTPATIENT
Start: 2021-07-16 | End: 2021-10-31

## 2021-07-16 NOTE — TELEPHONE ENCOUNTER
I refilled her prescription but she has not been seen since September of last year so she needs to be making an appointment

## 2021-10-13 RX ORDER — AMLODIPINE BESYLATE 5 MG/1
TABLET ORAL
Qty: 90 TABLET | Refills: 0 | Status: SHIPPED | OUTPATIENT
Start: 2021-10-13 | End: 2022-01-13

## 2021-10-31 RX ORDER — MELOXICAM 15 MG/1
TABLET ORAL
Qty: 90 TABLET | Refills: 0 | Status: SHIPPED | OUTPATIENT
Start: 2021-10-31 | End: 2022-01-29

## 2021-11-01 NOTE — TELEPHONE ENCOUNTER
I sent a refill on her meloxicam  She was told 2 weeks ago to make an appt as it has been more than a year since her last appt  She has yet to schedule an appt  These will be her last refills until she makes an appt and is seen in the office

## 2022-01-13 RX ORDER — AMLODIPINE BESYLATE 5 MG/1
TABLET ORAL
Qty: 90 TABLET | Refills: 0 | Status: SHIPPED | OUTPATIENT
Start: 2022-01-13 | End: 2022-04-20

## 2022-01-29 RX ORDER — MELOXICAM 15 MG/1
15 TABLET ORAL DAILY
Qty: 90 TABLET | Refills: 0 | Status: SHIPPED | OUTPATIENT
Start: 2022-01-29 | End: 2022-04-26

## 2022-04-19 NOTE — TELEPHONE ENCOUNTER
"Is she seeing a new doctor?  She has not been seen in our office in a year and a half  Looks like messages have been sent to her and her rx bottles have also had \"must make an appt\" on them  She has still not make an appt to be seen  "

## 2022-04-19 NOTE — TELEPHONE ENCOUNTER
Sent patient a Mychart message letting her know and asking her to call the office to schedule appointment

## 2022-04-20 ENCOUNTER — TELEPHONE (OUTPATIENT)
Dept: FAMILY MEDICINE CLINIC | Facility: CLINIC | Age: 62
End: 2022-04-20

## 2022-04-20 RX ORDER — AMLODIPINE BESYLATE 5 MG/1
TABLET ORAL
Qty: 30 TABLET | Refills: 0 | Status: SHIPPED | OUTPATIENT
Start: 2022-04-20 | End: 2022-05-03 | Stop reason: SDUPTHER

## 2022-04-20 NOTE — TELEPHONE ENCOUNTER
She is still a patient here, just has not been since due to covid. I transferred pt to schedule an apt.

## 2022-04-20 NOTE — TELEPHONE ENCOUNTER
Patient called .she has an appointment for next available provider 05/03/22 for med refill f/u with Clary

## 2022-04-26 RX ORDER — MELOXICAM 15 MG/1
15 TABLET ORAL DAILY
Qty: 30 TABLET | Refills: 0 | Status: SHIPPED | OUTPATIENT
Start: 2022-04-26 | End: 2022-05-03 | Stop reason: SDUPTHER

## 2022-05-03 ENCOUNTER — LAB (OUTPATIENT)
Dept: FAMILY MEDICINE CLINIC | Facility: CLINIC | Age: 62
End: 2022-05-03

## 2022-05-03 ENCOUNTER — OFFICE VISIT (OUTPATIENT)
Dept: FAMILY MEDICINE CLINIC | Facility: CLINIC | Age: 62
End: 2022-05-03

## 2022-05-03 VITALS
HEIGHT: 65 IN | RESPIRATION RATE: 16 BRPM | WEIGHT: 199 LBS | SYSTOLIC BLOOD PRESSURE: 134 MMHG | OXYGEN SATURATION: 96 % | DIASTOLIC BLOOD PRESSURE: 84 MMHG | TEMPERATURE: 97 F | HEART RATE: 82 BPM | BODY MASS INDEX: 33.15 KG/M2

## 2022-05-03 DIAGNOSIS — I10 HYPERTENSION, UNSPECIFIED TYPE: Primary | ICD-10-CM

## 2022-05-03 DIAGNOSIS — R73.9 HYPERGLYCEMIA: ICD-10-CM

## 2022-05-03 DIAGNOSIS — E04.1 THYROID NODULE: ICD-10-CM

## 2022-05-03 DIAGNOSIS — M19.90 ARTHRITIS: ICD-10-CM

## 2022-05-03 DIAGNOSIS — Z12.11 ENCOUNTER FOR SCREENING COLONOSCOPY: ICD-10-CM

## 2022-05-03 DIAGNOSIS — Z12.31 SCREENING MAMMOGRAM FOR BREAST CANCER: ICD-10-CM

## 2022-05-03 DIAGNOSIS — E78.2 MODERATE MIXED HYPERLIPIDEMIA NOT REQUIRING STATIN THERAPY: ICD-10-CM

## 2022-05-03 LAB
ALBUMIN SERPL-MCNC: 4.3 G/DL (ref 3.5–5.2)
ALBUMIN/GLOB SERPL: 1.4 G/DL
ALP SERPL-CCNC: 95 U/L (ref 39–117)
ALT SERPL W P-5'-P-CCNC: 30 U/L (ref 1–33)
ANION GAP SERPL CALCULATED.3IONS-SCNC: 14.8 MMOL/L (ref 5–15)
AST SERPL-CCNC: 22 U/L (ref 1–32)
BASOPHILS # BLD AUTO: 0.06 10*3/MM3 (ref 0–0.2)
BASOPHILS NFR BLD AUTO: 0.8 % (ref 0–1.5)
BILIRUB SERPL-MCNC: 0.6 MG/DL (ref 0–1.2)
BUN SERPL-MCNC: 18 MG/DL (ref 8–23)
BUN/CREAT SERPL: 27.3 (ref 7–25)
CALCIUM SPEC-SCNC: 9.8 MG/DL (ref 8.6–10.5)
CHLORIDE SERPL-SCNC: 105 MMOL/L (ref 98–107)
CHOLEST SERPL-MCNC: 263 MG/DL (ref 0–200)
CO2 SERPL-SCNC: 23.2 MMOL/L (ref 22–29)
CREAT SERPL-MCNC: 0.66 MG/DL (ref 0.57–1)
DEPRECATED RDW RBC AUTO: 46.8 FL (ref 37–54)
EGFRCR SERPLBLD CKD-EPI 2021: 98.7 ML/MIN/1.73
EOSINOPHIL # BLD AUTO: 0.14 10*3/MM3 (ref 0–0.4)
EOSINOPHIL NFR BLD AUTO: 1.9 % (ref 0.3–6.2)
ERYTHROCYTE [DISTWIDTH] IN BLOOD BY AUTOMATED COUNT: 13.4 % (ref 12.3–15.4)
GLOBULIN UR ELPH-MCNC: 3 GM/DL
GLUCOSE SERPL-MCNC: 100 MG/DL (ref 65–99)
HBA1C MFR BLD: 5.4 % (ref 3.5–5.6)
HCT VFR BLD AUTO: 44.3 % (ref 34–46.6)
HDLC SERPL-MCNC: 48 MG/DL (ref 40–60)
HGB BLD-MCNC: 14.7 G/DL (ref 12–15.9)
IMM GRANULOCYTES # BLD AUTO: 0.03 10*3/MM3 (ref 0–0.05)
IMM GRANULOCYTES NFR BLD AUTO: 0.4 % (ref 0–0.5)
LDLC SERPL CALC-MCNC: 178 MG/DL (ref 0–100)
LDLC/HDLC SERPL: 3.66 {RATIO}
LYMPHOCYTES # BLD AUTO: 2.86 10*3/MM3 (ref 0.7–3.1)
LYMPHOCYTES NFR BLD AUTO: 39.1 % (ref 19.6–45.3)
MCH RBC QN AUTO: 31.3 PG (ref 26.6–33)
MCHC RBC AUTO-ENTMCNC: 33.2 G/DL (ref 31.5–35.7)
MCV RBC AUTO: 94.5 FL (ref 79–97)
MONOCYTES # BLD AUTO: 0.57 10*3/MM3 (ref 0.1–0.9)
MONOCYTES NFR BLD AUTO: 7.8 % (ref 5–12)
NEUTROPHILS NFR BLD AUTO: 3.65 10*3/MM3 (ref 1.7–7)
NEUTROPHILS NFR BLD AUTO: 50 % (ref 42.7–76)
NRBC BLD AUTO-RTO: 0 /100 WBC (ref 0–0.2)
PLATELET # BLD AUTO: 363 10*3/MM3 (ref 140–450)
PMV BLD AUTO: 10.4 FL (ref 6–12)
POTASSIUM SERPL-SCNC: 4.3 MMOL/L (ref 3.5–5.2)
PROT SERPL-MCNC: 7.3 G/DL (ref 6–8.5)
RBC # BLD AUTO: 4.69 10*6/MM3 (ref 3.77–5.28)
SODIUM SERPL-SCNC: 143 MMOL/L (ref 136–145)
TRIGL SERPL-MCNC: 197 MG/DL (ref 0–150)
TSH SERPL DL<=0.05 MIU/L-ACNC: 1.41 UIU/ML (ref 0.27–4.2)
VLDLC SERPL-MCNC: 37 MG/DL (ref 5–40)
WBC NRBC COR # BLD: 7.31 10*3/MM3 (ref 3.4–10.8)

## 2022-05-03 PROCEDURE — 36415 COLL VENOUS BLD VENIPUNCTURE: CPT | Performed by: PHYSICIAN ASSISTANT

## 2022-05-03 PROCEDURE — 80061 LIPID PANEL: CPT | Performed by: PHYSICIAN ASSISTANT

## 2022-05-03 PROCEDURE — 99214 OFFICE O/P EST MOD 30 MIN: CPT | Performed by: PHYSICIAN ASSISTANT

## 2022-05-03 PROCEDURE — 80050 GENERAL HEALTH PANEL: CPT | Performed by: PHYSICIAN ASSISTANT

## 2022-05-03 PROCEDURE — 83036 HEMOGLOBIN GLYCOSYLATED A1C: CPT | Performed by: PHYSICIAN ASSISTANT

## 2022-05-03 RX ORDER — MELOXICAM 15 MG/1
15 TABLET ORAL DAILY
Qty: 90 TABLET | Refills: 3 | Status: SHIPPED | OUTPATIENT
Start: 2022-05-03

## 2022-05-03 RX ORDER — AMLODIPINE BESYLATE 5 MG/1
5 TABLET ORAL DAILY
Qty: 90 TABLET | Refills: 3 | Status: SHIPPED | OUTPATIENT
Start: 2022-05-03

## 2022-05-03 NOTE — PROGRESS NOTES
Subjective   Rosalia Gamino is a 62 y.o. female.     Pt presents to follow up on her hypertension.  She also has hx of arthritis and needs her meloxicam refilled.  Her blood pressure has been stable at home and is usually in the 130s/80s.  She denies any adverse effects with her amlodipine.  She tries to stay active and exercise along with eating healthy.  She is due for labs and mammogram.  She does need to get her colonoscopy done again.  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  She also has a  who is dealing with liver cancer and she babysits her grandkids.       The following portions of the patient's history were reviewed and updated as appropriate: allergies, current medications, past family history, past medical history, past social history, past surgical history and problem list.  Past Medical History:   Diagnosis Date   • Arthritis      Past Surgical History:   Procedure Laterality Date   • ROTATOR CUFF REPAIR Right      No family history on file.  Social History     Socioeconomic History   • Marital status:    Tobacco Use   • Smoking status: Former Smoker   • Smokeless tobacco: Never Used   • Tobacco comment: occasionally         Current Outpatient Medications:   •  amLODIPine (NORVASC) 5 MG tablet, Take 1 tablet by mouth Daily. for blood pressure., Disp: 90 tablet, Rfl: 3  •  B Complex Vitamins (VITAMIN B COMPLEX) capsule capsule, Take  by mouth Daily., Disp: , Rfl:   •  Calcium Carb-Cholecalciferol 1000-800 MG-UNIT tablet, CALCIUM + D3 TABS, Disp: , Rfl:   •  Lactobacillus Rhamnosus, GG, ([employer]S PROBIOTIC, LACTOBACILLUS,) capsule, [employer]S PROBIOTIC (LACTOBACILLUS) CAPS, Disp: , Rfl:   •  Magnesium 100 MG capsule, M2 MAGNESIUM 100 MG CAPS, Disp: , Rfl:   •  meloxicam (MOBIC) 15 MG tablet, Take 1 tablet by mouth Daily., Disp: 90 tablet, Rfl: 3  •  Milk Thistle-Turmeric (SILYMARIN PO), SILYMARIN CAPS, Disp: , Rfl:   •  MULTIPLE VITAMIN-FOLIC ACID PO, MULTIVITAMINS  "TABS, Disp: , Rfl:   •  benzonatate (TESSALON) 200 MG capsule, 1 capsule p.o. every 8 hours as needed cough, Disp: 21 capsule, Rfl: 0  •  doxycycline (ADOXA) 100 MG tablet, Take 1 tablet by mouth 2 (Two) Times a Day., Disp: 20 tablet, Rfl: 0    Review of Systems   Constitutional: Negative for activity change, appetite change, chills, diaphoresis, fatigue, fever, unexpected weight gain and unexpected weight loss.   Respiratory: Negative for chest tightness and shortness of breath.    Cardiovascular: Negative for chest pain, palpitations and leg swelling.   Gastrointestinal: Negative for abdominal pain, nausea and vomiting.   Musculoskeletal: Positive for arthralgias. Negative for gait problem.   Neurological: Negative for dizziness, tremors, syncope, weakness, light-headedness, headache and confusion.   Psychiatric/Behavioral: Positive for stress. Negative for sleep disturbance and depressed mood. The patient is not nervous/anxious.      /84 (BP Location: Left arm, Patient Position: Sitting, Cuff Size: Large Adult)   Pulse 82   Temp 97 °F (36.1 °C) (Temporal)   Resp 16   Ht 165.1 cm (65\")   Wt 90.3 kg (199 lb)   SpO2 96%   BMI 33.12 kg/m²       Objective   Physical Exam  Vitals and nursing note reviewed.   Constitutional:       Appearance: Normal appearance.   Neck:      Thyroid: Thyroid mass present. No thyromegaly or thyroid tenderness.      Vascular: No carotid bruit.   Cardiovascular:      Rate and Rhythm: Normal rate and regular rhythm.      Heart sounds: Normal heart sounds.   Pulmonary:      Effort: Pulmonary effort is normal.      Breath sounds: Normal breath sounds.   Musculoskeletal:      Cervical back: Normal range of motion and neck supple.      Right lower leg: No edema.      Left lower leg: No edema.   Skin:     General: Skin is warm and dry.   Neurological:      General: No focal deficit present.      Mental Status: She is alert and oriented to person, place, and time.   Psychiatric:       "   Mood and Affect: Mood normal.         Behavior: Behavior normal.         Thought Content: Thought content normal.         Procedures     Assessment/Plan   Diagnoses and all orders for this visit:    1. Hypertension, unspecified type (Primary)  Comments:  Stable.  Continue amlodipine and low sodium diet.  Continue to stay active with daily exercise.  Orders:  -     amLODIPine (NORVASC) 5 MG tablet; Take 1 tablet by mouth Daily. for blood pressure.  Dispense: 90 tablet; Refill: 3    2. Arthritis  Comments:  continue meloxicam as needed.  Orders:  -     meloxicam (MOBIC) 15 MG tablet; Take 1 tablet by mouth Daily.  Dispense: 90 tablet; Refill: 3  -     CBC & Differential    3. Moderate mixed hyperlipidemia not requiring statin therapy  Comments:  Will check labs and notify with results. Continue to work on low saturated fat and high fiber diet along with exercise.  Orders:  -     Comprehensive Metabolic Panel  -     Lipid Panel    4. Screening mammogram for breast cancer  Comments:  Pt to get mammogram done at her convenience.  Orders:  -     Mammo Screening Digital Tomosynthesis Bilateral With CAD; Future    5. Encounter for screening colonoscopy  Comments:  Pt to get colonoscopy done at her convenience.  Orders:  -     Ambulatory Referral For Screening Colonoscopy    6. Hyperglycemia  Comments:  Will check labs today and notify with results.  Orders:  -     Hemoglobin A1c    7. Thyroid nodule  Comments:  Pt to get ultrasound done and will notify with results.  Orders:  -     US Thyroid; Future  -     TSH    I spent 30 minutes of patient care including reviewing pt's previous hx, reviewing current symptoms, performing exam, ordering tests, discussing treatment plan and completing my note.

## 2022-05-09 DIAGNOSIS — E78.2 MIXED HYPERLIPIDEMIA: Primary | ICD-10-CM

## 2022-08-09 ENCOUNTER — TELEPHONE (OUTPATIENT)
Dept: FAMILY MEDICINE CLINIC | Facility: CLINIC | Age: 62
End: 2022-08-09

## 2022-08-09 DIAGNOSIS — E04.1 THYROID NODULE: Primary | ICD-10-CM

## 2022-08-09 NOTE — TELEPHONE ENCOUNTER
Please fax order to [facility] and let pt know it is time to repeat her thyroid ultrasound.  She can contact them to schedule it.

## 2022-08-09 NOTE — TELEPHONE ENCOUNTER
----- Message from REBA Gil sent at 5/13/2022  9:41 AM EDT -----  Repeat thyroid ultrasound due around 8/13/22 due to thyroid nodules.

## 2022-08-23 DIAGNOSIS — E04.1 THYROID NODULE: Primary | ICD-10-CM

## 2022-09-19 ENCOUNTER — LAB (OUTPATIENT)
Dept: LAB | Facility: HOSPITAL | Age: 62
End: 2022-09-19

## 2022-09-19 ENCOUNTER — OFFICE VISIT (OUTPATIENT)
Dept: ENDOCRINOLOGY | Facility: CLINIC | Age: 62
End: 2022-09-19

## 2022-09-19 VITALS
HEIGHT: 65 IN | BODY MASS INDEX: 32.99 KG/M2 | DIASTOLIC BLOOD PRESSURE: 80 MMHG | WEIGHT: 198 LBS | OXYGEN SATURATION: 94 % | HEART RATE: 58 BPM | SYSTOLIC BLOOD PRESSURE: 128 MMHG

## 2022-09-19 DIAGNOSIS — E04.2 MULTIPLE THYROID NODULES: ICD-10-CM

## 2022-09-19 DIAGNOSIS — E04.2 MULTIPLE THYROID NODULES: Primary | ICD-10-CM

## 2022-09-19 DIAGNOSIS — I10 PRIMARY HYPERTENSION: ICD-10-CM

## 2022-09-19 LAB
T4 FREE SERPL-MCNC: 1.17 NG/DL (ref 0.93–1.7)
TSH SERPL DL<=0.05 MIU/L-ACNC: 1.42 UIU/ML (ref 0.27–4.2)

## 2022-09-19 PROCEDURE — 84443 ASSAY THYROID STIM HORMONE: CPT

## 2022-09-19 PROCEDURE — 36415 COLL VENOUS BLD VENIPUNCTURE: CPT

## 2022-09-19 PROCEDURE — 86376 MICROSOMAL ANTIBODY EACH: CPT

## 2022-09-19 PROCEDURE — 84439 ASSAY OF FREE THYROXINE: CPT

## 2022-09-19 PROCEDURE — 99204 OFFICE O/P NEW MOD 45 MIN: CPT | Performed by: INTERNAL MEDICINE

## 2022-09-19 RX ORDER — LORATADINE 10 MG/1
10 TABLET ORAL DAILY
COMMUNITY
End: 2022-12-09

## 2022-09-19 NOTE — PATIENT INSTRUCTIONS
Please arrange for ultrasound-guided fine-needle aspiration thyroid biopsy on right dominant thyroid nodule and isthmus nodule which is on the right side of the isthmus.

## 2022-09-19 NOTE — PROGRESS NOTES
Endocrine Consult Outpatient  Referred by Ms. Clary Shay for multiple thyroid nodule consultation  Patient Care Team:  Karen Glynn MD as PCP - General     Chief Complaint: Multiple thyroid nodules        HPI: This is a 62-year-old female with history of hypertension apparently was felt to have enlarged thyroid by her primary care nurse practitioner.  She subsequently had thyroid ultrasound done in May 2022 which did show multiple thyroid nodules and then she subsequently had a repeat ultrasound of thyroid in August 2022 which did not show any suspicious nodules on the right side as well as right isthmus site.  She is now referred here for further evaluation management.  She is not taking any thyroid medication.  Her TSH was normal.  She denies any family history of thyroid cancer, no history of radiation exposure.  She denies any trouble swallowing or choking or change in the voice or hoarseness.  Overall doing well.    Past Medical History:   Diagnosis Date   • Arthritis    • Hypertension        Social History     Socioeconomic History   • Marital status:      Spouse name: Mu   • Number of children: 4   • Years of education: 12   • Highest education level: Master's degree (e.g., MA, MS, Augustus, MEd, MSW, YESENIA)   Tobacco Use   • Smoking status: Former Smoker   • Smokeless tobacco: Never Used   • Tobacco comment: occasionally   Vaping Use   • Vaping Use: Never used   Substance and Sexual Activity   • Alcohol use: Yes   • Drug use: Defer       Family History   Problem Relation Age of Onset   • Hypertension Mother    • Diabetes Mother    • Heart disease Mother    • Hyperlipidemia Mother    • Cancer Father         Prostate / Colon   • Stroke Brother        Allergies   Allergen Reactions   • Penicillins Hives       ROS:   Constitutional:  Denies fatigue, tiredness.    Eyes:  Denies change in visual acuity   HENT:  Denies nasal congestion or sore throat   Respiratory: denies cough, shortness of  breath.   Cardiovascular:  denies chest pain, edema   GI:  Denies abdominal pain, nausea, vomiting.    :  Denies dysuria   Musculoskeletal:  Denies back pain or joint pain   Integument:  Denies dry skin, rash   Neurologic:  Denies headache, focal weakness or sensory changes   Endocrine:  Denies polyuria or polydipsia   Psychiatric:  Denies depression or anxiety      Vitals:    09/19/22 1042   BP: 128/80   Pulse: 58   SpO2: 94%     Body mass index is 32.95 kg/m².      Physical Exam:  GEN: NAD, conversant  EYES: EOMI, PERRL, no conjunctival erythema  NECK: no thyromegaly, full ROM   CV: RRR, no murmurs/rubs/gallops, no peripheral edema  LUNG: CTAB, no wheezes/rales/ronchi  SKIN: no rashes, no acanthosis  MSK: no deformities, full ROM of all extremities  NEURO: no tremors, DTR normal  PSYCH: AOX3, appropriate mood, affect normal      Results Review:     I reviewed the patient's new clinical results.    Lab Results   Component Value Date    GLUCOSE 100 (H) 05/03/2022    BUN 18 05/03/2022    CREATININE 0.66 05/03/2022    EGFRIFNONA 94 09/22/2020    BCR 27.3 (H) 05/03/2022    K 4.3 05/03/2022    CO2 23.2 05/03/2022    CALCIUM 9.8 05/03/2022    ALBUMIN 4.30 05/03/2022    LABIL2 1.4 04/05/2019    AST 22 05/03/2022    ALT 30 05/03/2022    CHOL 263 (H) 05/03/2022    TRIG 197 (H) 05/03/2022    HDL 48 05/03/2022     (H) 05/03/2022     Lab Results   Component Value Date    HGBA1C 5.4 05/03/2022    HGBA1C 5.2 11/06/2019     Lab Results   Component Value Date    CREATININE 0.66 05/03/2022     Lab Results   Component Value Date    TSH 1.410 05/03/2022   Comprehensive Metabolic Panel (05/03/2022 12:12)   TSH (05/03/2022 12:12)   SCANNED - IMAGING (08/09/2022)   US Thyroid (05/12/2022)       Medication Review: Reviewed.       Current Outpatient Medications:   •  amLODIPine (NORVASC) 5 MG tablet, Take 1 tablet by mouth Daily. for blood pressure., Disp: 90 tablet, Rfl: 3  •  Cholecalciferol (Vitamin D-3) 125 MCG (5000 UT)  tablet, Take  by mouth., Disp: , Rfl:   •  loratadine (Allergy Relief) 10 MG tablet, Take 10 mg by mouth Daily., Disp: , Rfl:   •  Magnesium 100 MG capsule, M2 MAGNESIUM 100 MG CAPS, Disp: , Rfl:   •  meloxicam (MOBIC) 15 MG tablet, Take 1 tablet by mouth Daily., Disp: 90 tablet, Rfl: 3  •  MULTIPLE VITAMIN-FOLIC ACID PO, MULTIVITAMINS TABS, Disp: , Rfl:   •  NON FORMULARY, [place], Disp: , Rfl:   •  NON FORMULARY, Super Supplemental, Disp: , Rfl:   •  NON FORMULARY, TRIM, Disp: , Rfl:   •  NON FORMULARY, Trebiotic, Disp: , Rfl:         Assessment and plan:  Multiple thyroid nodules: This is a 62-year-old female with history of hypertension was found to have palpable thyroid nodules.  She subsequently had thyroid ultrasound in May 2022 in August 2022.  The ultrasound from August 2022 does show suspicious nodule on the right side and right isthmus area as well.  Recommended ultrasound-guided final aspiration biopsy of right dominant thyroid nodule and right isthmus side thyroid nodule.  She is in agreement.  We will arrange.  We will check TSH, free T4 and TPO antibodies.    Hypretension: Well-controlled.    Hunter Wright MD FACE.

## 2022-09-20 LAB — THYROPEROXIDASE AB SERPL-ACNC: <8 IU/ML (ref 0–34)

## 2022-09-21 ENCOUNTER — TELEPHONE (OUTPATIENT)
Dept: ENDOCRINOLOGY | Facility: CLINIC | Age: 62
End: 2022-09-21

## 2022-09-21 NOTE — TELEPHONE ENCOUNTER
Oliverio Shay,     Dr. Wright would like to schedule Rosalia for a thyroid biopsy. The patient will need to hold her Mobic for 7 days before the procedure. Please advise if this will be okay?

## 2022-09-22 ENCOUNTER — PATIENT ROUNDING (BHMG ONLY) (OUTPATIENT)
Dept: ENDOCRINOLOGY | Facility: CLINIC | Age: 62
End: 2022-09-22

## 2022-09-29 ENCOUNTER — HOSPITAL ENCOUNTER (OUTPATIENT)
Dept: ULTRASOUND IMAGING | Facility: HOSPITAL | Age: 62
Discharge: HOME | End: 2022-09-29
Admitting: INTERNAL MEDICINE

## 2022-09-29 DIAGNOSIS — E04.2 MULTIPLE THYROID NODULES: ICD-10-CM

## 2022-09-29 PROCEDURE — 88305 TISSUE EXAM BY PATHOLOGIST: CPT | Performed by: INTERNAL MEDICINE

## 2022-09-29 PROCEDURE — 0 LIDOCAINE 1 % SOLUTION: Performed by: INTERNAL MEDICINE

## 2022-09-29 PROCEDURE — 88173 CYTOPATH EVAL FNA REPORT: CPT | Performed by: INTERNAL MEDICINE

## 2022-09-29 RX ORDER — LIDOCAINE HYDROCHLORIDE 10 MG/ML
10 INJECTION, SOLUTION INFILTRATION; PERINEURAL ONCE
Status: COMPLETED | OUTPATIENT
Start: 2022-09-29 | End: 2022-09-29

## 2022-09-29 RX ADMIN — LIDOCAINE HYDROCHLORIDE 5 ML: 10 INJECTION, SOLUTION INFILTRATION; PERINEURAL at 13:09

## 2022-09-30 LAB
LAB AP CASE REPORT: NORMAL
PATH REPORT.FINAL DX SPEC: NORMAL
PATH REPORT.GROSS SPEC: NORMAL

## 2022-10-03 ENCOUNTER — TELEPHONE (OUTPATIENT)
Dept: ENDOCRINOLOGY | Facility: CLINIC | Age: 62
End: 2022-10-03

## 2022-10-03 DIAGNOSIS — C73 THYROID CANCER: Primary | ICD-10-CM

## 2022-10-03 NOTE — TELEPHONE ENCOUNTER
The pt spoke to you this weekend and she found a surgeon at  [facility] Dr Neyda Luz. She is asking what is her next step.

## 2022-11-11 ENCOUNTER — TELEPHONE (OUTPATIENT)
Dept: ENDOCRINOLOGY | Facility: CLINIC | Age: 62
End: 2022-11-11

## 2022-11-11 DIAGNOSIS — I10 PRIMARY HYPERTENSION: ICD-10-CM

## 2022-11-11 DIAGNOSIS — E89.0 S/P TOTAL THYROIDECTOMY: ICD-10-CM

## 2022-11-11 DIAGNOSIS — E04.2 MULTIPLE THYROID NODULES: Primary | ICD-10-CM

## 2022-11-11 DIAGNOSIS — R73.9 HYPERGLYCEMIA: ICD-10-CM

## 2022-11-11 NOTE — TELEPHONE ENCOUNTER
Pt called stating that she had a total thyroidectomy 11/10/22- done by Dr. Luz. Pt has fu appt with you scheduled 3/20/23 and would like to know if she needs to fu sooner?

## 2022-11-15 NOTE — TELEPHONE ENCOUNTER
Hunter Wright MD  You Yesterday (10:50 AM)     Check TSH, free T4, TG panel and CMP and arrange for follow-up visit in the next few weeks.        -Surendra pt and scheduled appt 12/9. Pt states that she is having labs done at Dr. Rosenbaum office 11/23 and would like to do our labs there. Surendra Fraser at Dr. Rosenbaum office who states that they can do our labs also. Will fax to [phone] HARITHA Fraser when orders are signed.

## 2022-11-16 NOTE — TELEPHONE ENCOUNTER
Signed lab orders faxed to Dr. Rosenbaum office. Surendra pat and advised that she take a copy with her. Orders mailed to pt.

## 2022-11-23 RX ORDER — HYDROCODONE BITARTRATE AND ACETAMINOPHEN 5; 325 MG/1; MG/1
TABLET ORAL
COMMUNITY
Start: 2022-11-10 | End: 2022-12-09

## 2022-11-23 RX ORDER — LEVOTHYROXINE SODIUM 0.12 MG/1
TABLET ORAL
COMMUNITY
Start: 2022-11-10 | End: 2023-03-09

## 2022-12-09 ENCOUNTER — OFFICE VISIT (OUTPATIENT)
Dept: ENDOCRINOLOGY | Facility: CLINIC | Age: 62
End: 2022-12-09

## 2022-12-09 ENCOUNTER — LAB (OUTPATIENT)
Dept: LAB | Facility: HOSPITAL | Age: 62
End: 2022-12-09

## 2022-12-09 VITALS
WEIGHT: 202 LBS | BODY MASS INDEX: 33.66 KG/M2 | HEART RATE: 83 BPM | OXYGEN SATURATION: 97 % | SYSTOLIC BLOOD PRESSURE: 155 MMHG | DIASTOLIC BLOOD PRESSURE: 85 MMHG | HEIGHT: 65 IN

## 2022-12-09 DIAGNOSIS — E89.0 S/P TOTAL THYROIDECTOMY: ICD-10-CM

## 2022-12-09 DIAGNOSIS — I10 PRIMARY HYPERTENSION: ICD-10-CM

## 2022-12-09 DIAGNOSIS — R73.9 HYPERGLYCEMIA: ICD-10-CM

## 2022-12-09 DIAGNOSIS — E89.0 POSTOPERATIVE HYPOTHYROIDISM: ICD-10-CM

## 2022-12-09 DIAGNOSIS — E04.2 MULTIPLE THYROID NODULES: ICD-10-CM

## 2022-12-09 DIAGNOSIS — C73 THYROID CANCER: ICD-10-CM

## 2022-12-09 DIAGNOSIS — C73 THYROID CANCER: Primary | ICD-10-CM

## 2022-12-09 LAB
ALBUMIN SERPL-MCNC: 4.6 G/DL (ref 3.5–5.2)
ALBUMIN/GLOB SERPL: 1.8 G/DL
ALP SERPL-CCNC: 104 U/L (ref 39–117)
ALT SERPL W P-5'-P-CCNC: 35 U/L (ref 1–33)
ANION GAP SERPL CALCULATED.3IONS-SCNC: 8 MMOL/L (ref 5–15)
AST SERPL-CCNC: 24 U/L (ref 1–32)
BILIRUB SERPL-MCNC: 0.2 MG/DL (ref 0–1.2)
BUN SERPL-MCNC: 16 MG/DL (ref 8–23)
BUN/CREAT SERPL: 23.2 (ref 7–25)
CALCIUM SPEC-SCNC: 10 MG/DL (ref 8.6–10.5)
CHLORIDE SERPL-SCNC: 105 MMOL/L (ref 98–107)
CO2 SERPL-SCNC: 27 MMOL/L (ref 22–29)
CREAT SERPL-MCNC: 0.69 MG/DL (ref 0.57–1)
EGFRCR SERPLBLD CKD-EPI 2021: 97.7 ML/MIN/1.73
GLOBULIN UR ELPH-MCNC: 2.6 GM/DL
GLUCOSE SERPL-MCNC: 104 MG/DL (ref 65–99)
POTASSIUM SERPL-SCNC: 3.8 MMOL/L (ref 3.5–5.2)
PROT SERPL-MCNC: 7.2 G/DL (ref 6–8.5)
SODIUM SERPL-SCNC: 140 MMOL/L (ref 136–145)
T4 FREE SERPL-MCNC: 1.47 NG/DL (ref 0.93–1.7)
TSH SERPL DL<=0.05 MIU/L-ACNC: 2.62 UIU/ML (ref 0.27–4.2)

## 2022-12-09 PROCEDURE — 84439 ASSAY OF FREE THYROXINE: CPT

## 2022-12-09 PROCEDURE — 80053 COMPREHEN METABOLIC PANEL: CPT

## 2022-12-09 PROCEDURE — 84432 ASSAY OF THYROGLOBULIN: CPT

## 2022-12-09 PROCEDURE — 36415 COLL VENOUS BLD VENIPUNCTURE: CPT

## 2022-12-09 PROCEDURE — 99214 OFFICE O/P EST MOD 30 MIN: CPT | Performed by: INTERNAL MEDICINE

## 2022-12-09 PROCEDURE — 84443 ASSAY THYROID STIM HORMONE: CPT

## 2022-12-09 PROCEDURE — 86800 THYROGLOBULIN ANTIBODY: CPT

## 2022-12-09 NOTE — PROGRESS NOTES
Endocrine Progress Note Outpatient     Patient Care Team:  Karen Glynn MD as PCP - General  Neyda Luz MD as Consulting Physician (Surgical Oncology)    Chief Complaint: Follow-up thyroid nodule/thyroid cancer          HPI: This is a 62-year-old female with prior history of hypertension was seen here back in September for multiple thyroid nodules and they were suspicious looking.  She subsequently had biopsy followed by total thyroidectomy which was done on November 10, 2022 at [facility] with Dr. Neyda Luz.  Postsurgical path did show multifocal PTC with 3 out of 5 lymph nodes positive.  She is now in the process to see radiation oncologist.  She is currently on levothyroxine 125 mcg p.o. daily.    I reviewed her pathology report: She did have multifocal papillary thyroid cancer in the thyroid with a largest foci was 3.1 cm and the second largest was 1.7 cm.  She did have classical papillary thyroid cancer on the largest 1 and to have 3 out of 5 lymph nodes positive.  She is now on levothyroxine 125 mcg p.o. daily and feeling fairly well.    Past Medical History:   Diagnosis Date   • Arthritis    • Hypertension        Social History     Socioeconomic History   • Marital status:      Spouse name: Mu   • Number of children: 4   • Years of education: 12   • Highest education level: Master's degree (e.g., MA, MS, Augustus, MEd, MSW, YESENIA)   Tobacco Use   • Smoking status: Former   • Smokeless tobacco: Never   • Tobacco comments:     occasionally   Vaping Use   • Vaping Use: Never used   Substance and Sexual Activity   • Alcohol use: Yes   • Drug use: Defer   • Sexual activity: Defer       Family History   Problem Relation Age of Onset   • Hypertension Mother    • Diabetes Mother    • Heart disease Mother    • Hyperlipidemia Mother    • Cancer Father         Prostate / Colon   • Stroke Brother        Allergies   Allergen Reactions   • Penicillins Hives       ROS:    Constitutional:  Denies fatigue, tiredness.    Eyes:  Denies change in visual acuity   HENT:  Denies nasal congestion or sore throat   Respiratory: denies cough, shortness of breath.   Cardiovascular:  denies chest pain, edema   GI:  Denies abdominal pain, nausea, vomiting.   Musculoskeletal:  Denies back pain or joint pain   Integument:  Denies dry skin and rash   Neurologic:  Denies headache, focal weakness or sensory changes   Endocrine:  Denies polyuria or polydipsia   Psychiatric:  Denies depression or anxiety      Vitals:    12/09/22 0937   BP: 155/85   Pulse: 83   SpO2: 97%     Body mass index is 33.61 kg/m².     Physical Exam:  GEN: NAD, conversant  EYES: EOMI, PERRL, no conjunctival erythema  NECK: no thyromegaly, full ROM   CV: RRR, no murmurs/rubs/gallops, no peripheral edema  LUNG: CTAB, no wheezes/rales/ronchi  SKIN: no rashes, no acanthosis  MSK: no deformities, full ROM of all extremities  NEURO: no tremors, DTR normal  PSYCH: AOX3, appropriate mood, affect normal      Results Review:     I reviewed the patient's new clinical results.    Lab Results   Component Value Date    HGBA1C 5.4 05/03/2022    HGBA1C 5.2 11/06/2019      Lab Results   Component Value Date    GLUCOSE 100 (H) 05/03/2022    BUN 18 05/03/2022    CREATININE 0.66 05/03/2022    EGFRIFNONA 94 09/22/2020    BCR 27.3 (H) 05/03/2022    K 4.3 05/03/2022    CO2 23.2 05/03/2022    CALCIUM 9.3 11/10/2022    ALBUMIN 4.30 05/03/2022    LABIL2 1.4 04/05/2019    AST 22 05/03/2022    ALT 30 05/03/2022    CHOL 263 (H) 05/03/2022    TRIG 197 (H) 05/03/2022     (H) 05/03/2022    HDL 48 05/03/2022     Lab Results   Component Value Date    TSH 1.420 09/19/2022    FREET4 1.08 11/23/2022    THYROIDAB <8 09/19/2022     ENDOCRINOLOGY - SCAN - THYROID GLAND RESECTION, ULP, 11/23/2022 (11/23/2022)   COMPREHENSIVE METABOLIC PANEL (11/23/2022 11:55)   T4, FREE (11/23/2022 11:55)   TSH (11/23/2022 11:55)   - Miscellaneous Test (11/23/2022 11:55)        Medication Review: Reviewed.       Current Outpatient Medications:   •  amLODIPine (NORVASC) 5 MG tablet, Take 1 tablet by mouth Daily. for blood pressure., Disp: 90 tablet, Rfl: 3  •  Cholecalciferol (Vitamin D-3) 125 MCG (5000 UT) tablet, Take  by mouth., Disp: , Rfl:   •  Cholecalciferol 125 MCG (5000 UT) tablet, Take  by mouth Daily., Disp: , Rfl:   •  levothyroxine (SYNTHROID, LEVOTHROID) 125 MCG tablet, , Disp: , Rfl:   •  Magnesium 100 MG capsule, M2 MAGNESIUM 100 MG CAPS, Disp: , Rfl:   •  meloxicam (MOBIC) 15 MG tablet, Take 1 tablet by mouth Daily., Disp: 90 tablet, Rfl: 3  •  NON FORMULARY, Super Supplemental, Disp: , Rfl:   •  NON FORMULARY, TRIM, Disp: , Rfl:   •  NON FORMULARY, Trebiotic, Disp: , Rfl:   •  NON FORMULARY, Calcium, Zinc, Magnesium 1 daily, Disp: , Rfl:           Assessment and plan:  Papillary thyroid cancer: This is a 62-year-old female who was initially found to have suspicious thyroid nodule, subsequently had thyroid surgery showed multifocal PTC with 3 out of 5 lymph nodes positive.  She is in process to see radiation oncology.  In my opinion she will benefit from radioactive iodine treatment.    Hypothyroidism: Currently on levothyroxine at 125 mcg p.o. daily.  Last TSH and free T4 on November 23 was normal.  We will check TSH with free T4, TG panel and CMP.           Hunter Wright MD FACE.

## 2022-12-09 NOTE — PATIENT INSTRUCTIONS
Check labs today  Follow-up in 6 months  Please make sure you take your thyroid pill by itself with water at least 1 hour before or after other pills and food.

## 2022-12-14 LAB
THYROGLOB AB SERPL-ACNC: <1 IU/ML
THYROGLOB SERPL-MCNC: 1 NG/ML
THYROGLOB SERPL-MCNC: NORMAL NG/ML

## 2023-03-09 RX ORDER — LEVOTHYROXINE SODIUM 0.12 MG/1
TABLET ORAL
Qty: 90 TABLET | Refills: 1 | Status: SHIPPED | OUTPATIENT
Start: 2023-03-09

## 2023-03-15 RX ORDER — LEVOTHYROXINE SODIUM 0.12 MG/1
125 TABLET ORAL DAILY
COMMUNITY
Start: 2022-11-10 | End: 2023-03-20

## 2023-03-20 ENCOUNTER — OFFICE VISIT (OUTPATIENT)
Dept: ENDOCRINOLOGY | Facility: CLINIC | Age: 63
End: 2023-03-20
Payer: COMMERCIAL

## 2023-03-20 ENCOUNTER — LAB (OUTPATIENT)
Dept: LAB | Facility: HOSPITAL | Age: 63
End: 2023-03-20
Payer: COMMERCIAL

## 2023-03-20 VITALS
HEART RATE: 77 BPM | WEIGHT: 207 LBS | OXYGEN SATURATION: 96 % | HEIGHT: 65 IN | SYSTOLIC BLOOD PRESSURE: 135 MMHG | BODY MASS INDEX: 34.49 KG/M2 | DIASTOLIC BLOOD PRESSURE: 82 MMHG

## 2023-03-20 DIAGNOSIS — C73 THYROID CANCER: Primary | ICD-10-CM

## 2023-03-20 DIAGNOSIS — E89.0 POSTOPERATIVE HYPOTHYROIDISM: ICD-10-CM

## 2023-03-20 DIAGNOSIS — C73 THYROID CANCER: ICD-10-CM

## 2023-03-20 LAB
ALBUMIN SERPL-MCNC: 4.4 G/DL (ref 3.5–5.2)
ALBUMIN/GLOB SERPL: 1.5 G/DL
ALP SERPL-CCNC: 102 U/L (ref 39–117)
ALT SERPL W P-5'-P-CCNC: 35 U/L (ref 1–33)
ANION GAP SERPL CALCULATED.3IONS-SCNC: 11 MMOL/L (ref 5–15)
AST SERPL-CCNC: 29 U/L (ref 1–32)
BILIRUB SERPL-MCNC: 0.2 MG/DL (ref 0–1.2)
BUN SERPL-MCNC: 16 MG/DL (ref 8–23)
BUN/CREAT SERPL: 27.6 (ref 7–25)
CALCIUM SPEC-SCNC: 9.5 MG/DL (ref 8.6–10.5)
CHLORIDE SERPL-SCNC: 105 MMOL/L (ref 98–107)
CO2 SERPL-SCNC: 25 MMOL/L (ref 22–29)
CREAT SERPL-MCNC: 0.58 MG/DL (ref 0.57–1)
EGFRCR SERPLBLD CKD-EPI 2021: 101.8 ML/MIN/1.73
GLOBULIN UR ELPH-MCNC: 2.9 GM/DL
GLUCOSE SERPL-MCNC: 117 MG/DL (ref 65–99)
POTASSIUM SERPL-SCNC: 4 MMOL/L (ref 3.5–5.2)
PROT SERPL-MCNC: 7.3 G/DL (ref 6–8.5)
SODIUM SERPL-SCNC: 141 MMOL/L (ref 136–145)
T4 FREE SERPL-MCNC: 1.59 NG/DL (ref 0.93–1.7)
TSH SERPL DL<=0.05 MIU/L-ACNC: 2.06 UIU/ML (ref 0.27–4.2)

## 2023-03-20 PROCEDURE — 84443 ASSAY THYROID STIM HORMONE: CPT

## 2023-03-20 PROCEDURE — 84432 ASSAY OF THYROGLOBULIN: CPT

## 2023-03-20 PROCEDURE — 36415 COLL VENOUS BLD VENIPUNCTURE: CPT

## 2023-03-20 PROCEDURE — 99214 OFFICE O/P EST MOD 30 MIN: CPT | Performed by: INTERNAL MEDICINE

## 2023-03-20 PROCEDURE — 86800 THYROGLOBULIN ANTIBODY: CPT

## 2023-03-20 PROCEDURE — 80053 COMPREHEN METABOLIC PANEL: CPT

## 2023-03-20 PROCEDURE — 84439 ASSAY OF FREE THYROXINE: CPT

## 2023-03-20 RX ORDER — LANOLIN ALCOHOL/MO/W.PET/CERES
1000 CREAM (GRAM) TOPICAL DAILY
COMMUNITY

## 2023-03-20 NOTE — PROGRESS NOTES
Endocrine Progress Note Outpatient     Patient Care Team:  Karen Glynn MD as PCP - General  Neyda Luz MD as Consulting Physician (Surgical Oncology)    Chief Complaint: Follow-up thyroid nodule/thyroid cancer      HPI: This is a 62-year-old female with prior history of hypertension was seen here back in September for multiple thyroid nodules and they were suspicious looking.  She subsequently had biopsy followed by total thyroidectomy which was done on November 10, 2022 at [facility] with Dr. Neyda Luz.  Postsurgical path did show multifocal PTC with 3 out of 5 lymph nodes positive.  She is now in the process to see radiation oncologist.  She is currently on levothyroxine 125 mcg p.o. daily.    I reviewed her pathology report: She did have multifocal papillary thyroid cancer in the thyroid with a largest foci was 3.1 cm and the second largest was 1.7 cm.  She did have classical papillary thyroid cancer on the largest 1 and to have 3 out of 5 lymph nodes positive.  She is now on levothyroxine 125 mcg p.o. daily and feeling fairly well.    Recommend radioactive iodine treatment on January 25, 2023 with 102 mCi of I-131 followed by whole-body scan on February 1, 2023 which did show a uptake in the neck area as expected but there was no uptake anywhere else in the body according to that scan.    Past Medical History:   Diagnosis Date   • Arthritis    • Hypertension    • Thyroid cancer (HCC) 08/2022    Thyroidectomy 11/10/2022       Social History     Socioeconomic History   • Marital status:      Spouse name: Mu   • Number of children: 4   • Years of education: 12   • Highest education level: Master's degree (e.g., MA, MS, Augustus, MEd, MSW, YESENIA)   Tobacco Use   • Smoking status: Former     Packs/day: 0.25     Years: 1.00     Pack years: 0.25     Types: Cigarettes   • Smokeless tobacco: Never   • Tobacco comments:     At 18 for 3 months,  at 54 6 months   Vaping Use   •  Vaping Use: Never used   Substance and Sexual Activity   • Alcohol use: Yes     Alcohol/week: 7.0 standard drinks     Types: 5 Cans of beer, 2 Drinks containing 0.5 oz of alcohol per week   • Drug use: Never   • Sexual activity: Yes     Partners: Male     Birth control/protection: None       Family History   Problem Relation Age of Onset   • Hypertension Mother    • Diabetes Mother    • Heart disease Mother    • Hyperlipidemia Mother    • Cancer Father         Prostate and ema   • Stroke Brother        Allergies   Allergen Reactions   • Penicillins Hives       ROS:   Constitutional:  Denies fatigue, tiredness.    Eyes:  Denies change in visual acuity   HENT:  Denies nasal congestion or sore throat   Respiratory: denies cough, shortness of breath.   Cardiovascular:  denies chest pain, edema   GI:  Denies abdominal pain, nausea, vomiting.   Musculoskeletal:  Denies back pain or joint pain   Integument:  Denies dry skin and rash   Neurologic:  Denies headache, focal weakness or sensory changes   Endocrine:  Denies polyuria or polydipsia   Psychiatric:  Denies depression or anxiety      Vitals:    03/20/23 0909   BP: 135/82   Pulse: 77   SpO2: 96%     Body mass index is 34.45 kg/m².     Physical Exam:  GEN: NAD, conversant  EYES: EOMI, PERRL, no conjunctival erythema  NECK: no thyromegaly, full ROM   CV: RRR, no murmurs/rubs/gallops, no peripheral edema  LUNG: CTAB, no wheezes/rales/ronchi  SKIN: no rashes, no acanthosis  MSK: no deformities, full ROM of all extremities  NEURO: no tremors, DTR normal  PSYCH: AOX3, appropriate mood, affect normal      Results Review:     I reviewed the patient's new clinical results.    Lab Results   Component Value Date    HGBA1C 5.4 05/03/2022    HGBA1C 5.2 11/06/2019      Lab Results   Component Value Date    GLUCOSE 104 (H) 12/09/2022    BUN 16 12/09/2022    CREATININE 0.69 12/09/2022    EGFRIFNONA 94 09/22/2020    BCR 23.2 12/09/2022    K 3.8 12/09/2022    CO2 27.0 12/09/2022     CALCIUM 10.0 12/09/2022    ALBUMIN 4.60 12/09/2022    LABIL2 1.4 04/05/2019    AST 24 12/09/2022    ALT 35 (H) 12/09/2022    CHOL 263 (H) 05/03/2022    TRIG 197 (H) 05/03/2022     (H) 05/03/2022    HDL 48 05/03/2022     Lab Results   Component Value Date    TSH 2.620 12/09/2022    FREET4 1.47 12/09/2022    THYROIDAB <8 09/19/2022     ENDOCRINOLOGY - SCAN - THYROID GLAND RESECTION, ULP, 11/23/2022 (11/23/2022)     - THYROGLOBULIN WITH ANTI-TG W REFLEX (01/25/2023 09:59)   Comprehensive Thyroglobulin (12/09/2022 10:08)   Miscellaneous Test (11/23/2022 11:55)       Medication Review: Reviewed.       Current Outpatient Medications:   •  amLODIPine (NORVASC) 5 MG tablet, Take 1 tablet by mouth Daily. for blood pressure., Disp: 90 tablet, Rfl: 3  •  Cholecalciferol (Vitamin D-3) 125 MCG (5000 UT) tablet, Take  by mouth., Disp: , Rfl:   •  levothyroxine (SYNTHROID, LEVOTHROID) 125 MCG tablet, TAKE 1 TABLET (125 MCG TOTAL) BY MOUTH ONCE DAILY BEFORE BREAKFAST, Disp: 90 tablet, Rfl: 1  •  meloxicam (MOBIC) 15 MG tablet, Take 1 tablet by mouth Daily., Disp: 90 tablet, Rfl: 3  •  NON FORMULARY, Super Supplemental, Disp: , Rfl:   •  NON FORMULARY, TRIM, Disp: , Rfl:   •  NON FORMULARY, Trebiotic, Disp: , Rfl:           Assessment and plan:  Papillary thyroid cancer: This is a 62-year-old female who was initially found to have suspicious thyroid nodule, subsequently had thyroid surgery showed multifocal PTC with 3 out of 5 lymph nodes positive.  Had radioactive iodine treatment with 102 mCi in January 2023 followed by whole-body scan in February 2023 which did show an uptake in this neck.  We will follow TG panel.    Hypothyroidism: Currently on levothyroxine at 125 mcg p.o. daily.  We will check TSH and free T4 and CMP and then make further recommendations.    Assessment and plan from December 9, 2022 reviewed and updated.           Hunter Wright MD FACE.

## 2023-03-20 NOTE — PATIENT INSTRUCTIONS
Continue levothyroxine 125 mcg p.o. daily and make sure you take it by itself with water at least 1 hour before or after other pills and food.  Check TSH, free T4, CMP and TG panel today.

## 2023-03-28 LAB
THYROGLOB AB SERPL-ACNC: <1 IU/ML
THYROGLOB SERPL-MCNC: 0.6 NG/ML
THYROGLOB SERPL-MCNC: NORMAL NG/ML

## 2023-06-02 ENCOUNTER — OFFICE VISIT (OUTPATIENT)
Dept: ENDOCRINOLOGY | Facility: CLINIC | Age: 63
End: 2023-06-02

## 2023-06-02 VITALS
HEART RATE: 64 BPM | WEIGHT: 203 LBS | SYSTOLIC BLOOD PRESSURE: 155 MMHG | HEIGHT: 65 IN | DIASTOLIC BLOOD PRESSURE: 85 MMHG | BODY MASS INDEX: 33.82 KG/M2 | OXYGEN SATURATION: 95 %

## 2023-06-02 DIAGNOSIS — E89.0 POSTOPERATIVE HYPOTHYROIDISM: ICD-10-CM

## 2023-06-02 DIAGNOSIS — C73 THYROID CANCER: Primary | ICD-10-CM

## 2023-06-02 PROCEDURE — 99214 OFFICE O/P EST MOD 30 MIN: CPT | Performed by: INTERNAL MEDICINE

## 2023-06-02 RX ORDER — LEVOTHYROXINE SODIUM 137 UG/1
TABLET ORAL
Qty: 90 TABLET | Refills: 4 | Status: SHIPPED | OUTPATIENT
Start: 2023-06-02

## 2023-06-02 NOTE — PROGRESS NOTES
Endocrine Progress Note Outpatient     Patient Care Team:  Karen Glynn MD as PCP - General  Neyda Luz MD as Consulting Physician (Surgical Oncology)    Chief Complaint: Follow-up thyroid nodule/thyroid cancer      HPI: This is a 63-year-old female with prior history of hypertension was seen here back in September for multiple thyroid nodules and they were suspicious looking.  She subsequently had biopsy followed by total thyroidectomy which was done on November 10, 2022 at [facility] with Dr. Neyda Luz.  Postsurgical path did show multifocal PTC with 3 out of 5 lymph nodes positive.  She is currently on levothyroxine 125 mcg p.o. daily.    I reviewed her pathology report: She did have multifocal papillary thyroid cancer in the thyroid with a largest foci was 3.1 cm and the second largest was 1.7 cm.  She did have classical papillary thyroid cancer on the largest 1 and to have 3 out of 5 lymph nodes positive.  She is now on levothyroxine 125 mcg p.o. daily and feeling fairly well.    Recommend radioactive iodine treatment on January 25, 2023 with 102 mCi of I-131 followed by whole-body scan on February 1, 2023 which did show a uptake in the neck area as expected but there was no uptake anywhere else in the body according to that scan.    Past Medical History:   Diagnosis Date   • Arthritis    • Hypertension    • Thyroid cancer 08/2022    Thyroidectomy 11/10/2022       Social History     Socioeconomic History   • Marital status:      Spouse name: Mu   • Number of children: 4   • Years of education: 12   • Highest education level: Master's degree (e.g., MA, MS, Augustus, MEd, MSW, YESENIA)   Tobacco Use   • Smoking status: Former     Packs/day: 0.25     Years: 1.00     Pack years: 0.25     Types: Cigarettes   • Smokeless tobacco: Never   • Tobacco comments:     At 18 for 3 months,  at 54 6 months   Vaping Use   • Vaping Use: Never used   Substance and Sexual Activity   •  Alcohol use: Yes     Alcohol/week: 7.0 standard drinks     Types: 5 Cans of beer, 2 Drinks containing 0.5 oz of alcohol per week   • Drug use: Never   • Sexual activity: Yes     Partners: Male     Birth control/protection: None       Family History   Problem Relation Age of Onset   • Hypertension Mother    • Diabetes Mother    • Heart disease Mother    • Hyperlipidemia Mother    • Cancer Father         Prostate and ema   • Stroke Brother        Allergies   Allergen Reactions   • Penicillins Hives       ROS:   Constitutional:  Denies fatigue, tiredness.    Eyes:  Denies change in visual acuity   HENT:  Denies nasal congestion or sore throat   Respiratory: denies cough, shortness of breath.   Cardiovascular:  denies chest pain, edema   GI:  Denies abdominal pain, nausea, vomiting.   Musculoskeletal:  Denies back pain or joint pain   Integument:  Denies dry skin and rash   Neurologic:  Denies headache, focal weakness or sensory changes   Endocrine:  Denies polyuria or polydipsia   Psychiatric:  Denies depression or anxiety      Vitals:    06/02/23 0924   BP: 155/85   Pulse: 64   SpO2: 95%     Body mass index is 33.78 kg/m².     Physical Exam:  GEN: NAD, conversant  EYES: EOMI, PERRL, no conjunctival erythema  NECK: no thyromegaly, full ROM   CV: RRR, no murmurs/rubs/gallops, no peripheral edema  LUNG: CTAB, no wheezes/rales/ronchi  SKIN: no rashes, no acanthosis  MSK: no deformities, full ROM of all extremities  NEURO: no tremors, DTR normal  PSYCH: AOX3, appropriate mood, affect normal      Results Review:     I reviewed the patient's new clinical results.    Lab Results   Component Value Date    HGBA1C 5.4 05/03/2022    HGBA1C 5.2 11/06/2019      Lab Results   Component Value Date    GLUCOSE 117 (H) 03/20/2023    BUN 16 03/20/2023    CREATININE 0.58 03/20/2023    EGFRIFNONA 94 09/22/2020    BCR 27.6 (H) 03/20/2023    K 4.0 03/20/2023    CO2 25.0 03/20/2023    CALCIUM 9.5 03/20/2023    ALBUMIN 4.4 03/20/2023     LABIL2 1.4 04/05/2019    AST 29 03/20/2023    ALT 35 (H) 03/20/2023    CHOL 263 (H) 05/03/2022    TRIG 197 (H) 05/03/2022     (H) 05/03/2022    HDL 48 05/03/2022     Lab Results   Component Value Date    TSH 2.060 03/20/2023    FREET4 1.59 03/20/2023    THYROIDAB <8 09/19/2022     TSH+Free T4 (03/20/2023 09:32)   Comprehensive Metabolic Panel (03/20/2023 09:32)   Comprehensive Thyroglobulin (03/20/2023 09:32)       ENDOCRINOLOGY - SCAN - THYROID GLAND RESECTION, ULP, 11/23/2022 (11/23/2022)     - THYROGLOBULIN WITH ANTI-TG W REFLEX (01/25/2023 09:59)   Comprehensive Thyroglobulin (12/09/2022 10:08)   Miscellaneous Test (11/23/2022 11:55)       Medication Review: Reviewed.       Current Outpatient Medications:   •  amLODIPine (NORVASC) 5 MG tablet, Take 1 tablet by mouth Daily. for blood pressure., Disp: 90 tablet, Rfl: 3  •  Cholecalciferol (Vitamin D-3) 125 MCG (5000 UT) tablet, Take  by mouth., Disp: , Rfl:   •  levothyroxine (SYNTHROID, LEVOTHROID) 125 MCG tablet, TAKE 1 TABLET (125 MCG TOTAL) BY MOUTH ONCE DAILY BEFORE BREAKFAST, Disp: 90 tablet, Rfl: 1  •  meloxicam (MOBIC) 15 MG tablet, Take 1 tablet by mouth Daily., Disp: 90 tablet, Rfl: 3  •  NON FORMULARY, Super Supplemental, Disp: , Rfl:   •  NON FORMULARY, TRIM, Disp: , Rfl:   •  NON FORMULARY, Trebiotic, Disp: , Rfl:   •  vitamin B-12 (CYANOCOBALAMIN) 1000 MCG tablet, Take 1 tablet by mouth Daily., Disp: , Rfl:           Assessment and plan:  Papillary thyroid cancer: This is a 62-year-old female who was initially found to have suspicious thyroid nodule, subsequently had thyroid surgery showed multifocal PTC with 3 out of 5 lymph nodes positive.  Had radioactive iodine treatment with 102 mCi in January 2023 followed by whole-body scan in February 2023 which did show an uptake in this neck.  TG level is declining, the last 1 was 0.6.  We will follow TG panel.    Hypothyroidism: TSH and free T4 normal, will increase levothyroxine to 137 mcg p.o.  daily since she did have thyroid cancer and follow TSH and free T4.    Assessment and plan from March 20, 2023 reviewed and updated.           Hunter Wright MD FACE.

## 2023-06-02 NOTE — PATIENT INSTRUCTIONS
Increase levothyroxine to 137 mcg p.o. daily  Check TSH and free T4 in 6 weeks  Check TSH, free T4 and TG panel with CMP before follow-up in 6 months.

## 2023-06-05 DIAGNOSIS — I10 HYPERTENSION, UNSPECIFIED TYPE: ICD-10-CM

## 2023-06-05 RX ORDER — AMLODIPINE BESYLATE 5 MG/1
5 TABLET ORAL DAILY
Qty: 90 TABLET | Refills: 1 | Status: SHIPPED | OUTPATIENT
Start: 2023-06-05

## 2023-08-04 ENCOUNTER — TELEPHONE (OUTPATIENT)
Dept: FAMILY MEDICINE CLINIC | Facility: CLINIC | Age: 63
End: 2023-08-04

## 2023-08-04 NOTE — TELEPHONE ENCOUNTER
Caller: Rosalia Gamino    Relationship: Self    Best call back number: [phone]     What was the call regarding: PHYSICAL SCHEDULED WITH LUKASZ BEAVER. COBY BERNARDO HAS NO APPOINTMENTS FOR PHYSICAL UNTIL NEXT YEAR.

## 2023-08-06 NOTE — PROGRESS NOTES
Subjective   Rosalia Gamino is a 63 y.o. female.     History of Present Illness  Pt presents for routine physical.  Last colon cancer screening-due.  Father had colon cancer.    She's ob/gyn for mammogram and pap.    She was diagnosed with thyroid cancer after I saw her last.  She had thyroid removed and is going through radiation treatments but they are hopeful the next scans will be good.        She stays busy and active with grandchildren.     is in remission from liver cancer.         Past Medical History:   Diagnosis Date    Arthritis     Hypertension     Thyroid cancer 08/2022    Thyroidectomy 11/10/2022     Past Surgical History:   Procedure Laterality Date    ANKLE SURGERY      ROTATOR CUFF REPAIR Right     THYROIDECTOMY Bilateral      Family History   Problem Relation Age of Onset    Hypertension Mother     Diabetes Mother     Heart disease Mother     Hyperlipidemia Mother     Cancer Father         Prostate and ema    Stroke Brother      Social History     Socioeconomic History    Marital status:      Spouse name: Mu    Number of children: 4    Years of education: 12    Highest education level: Master's degree (e.g., MA, MS, Augustus, MEd, MSW, YESENIA)   Tobacco Use    Smoking status: Former     Packs/day: 0.25     Years: 1.00     Pack years: 0.25     Types: Cigarettes    Smokeless tobacco: Never    Tobacco comments:     At 18 for 3 months,  at 54 6 months   Vaping Use    Vaping Use: Never used   Substance and Sexual Activity    Alcohol use: Yes     Alcohol/week: 7.0 standard drinks     Types: 5 Cans of beer, 2 Drinks containing 0.5 oz of alcohol per week    Drug use: Never    Sexual activity: Yes     Partners: Male     Birth control/protection: None         Current Outpatient Medications:     amLODIPine (NORVASC) 5 MG tablet, TAKE 1 TABLET BY MOUTH DAILY FOR BLOOD PRESSURE, Disp: 90 tablet, Rfl: 1    Cholecalciferol (Vitamin D-3) 125 MCG (5000 UT) tablet, Take  by mouth., Disp: , Rfl:      "levothyroxine (SYNTHROID, LEVOTHROID) 137 MCG tablet, Take 1 tablet p.o. daily., Disp: 90 tablet, Rfl: 4    meloxicam (MOBIC) 15 MG tablet, Take 1 tablet by mouth Daily., Disp: 90 tablet, Rfl: 3    vitamin B-12 (CYANOCOBALAMIN) 1000 MCG tablet, Take 1 tablet by mouth Daily., Disp: , Rfl:     NON FORMULARY, Super Supplemental (Patient not taking: Reported on 8/7/2023), Disp: , Rfl:     NON FORMULARY, TRIM (Patient not taking: Reported on 8/7/2023), Disp: , Rfl:     NON FORMULARY, Trebiotic (Patient not taking: Reported on 8/7/2023), Disp: , Rfl:     Review of Systems   Constitutional:  Negative for activity change, appetite change, chills, diaphoresis, fatigue, fever, unexpected weight gain and unexpected weight loss.   HENT:  Negative for trouble swallowing.    Eyes:  Negative for blurred vision and visual disturbance.   Respiratory:  Negative for chest tightness and shortness of breath.    Cardiovascular:  Negative for chest pain, palpitations and leg swelling.   Gastrointestinal:  Negative for abdominal pain, constipation, diarrhea, nausea and vomiting.   Musculoskeletal:  Negative for gait problem.   Neurological:  Negative for dizziness, tremors, syncope, weakness, light-headedness, headache and confusion.   Psychiatric/Behavioral:  Negative for sleep disturbance.    /82 (BP Location: Left arm, Patient Position: Sitting, Cuff Size: Adult)   Pulse 77   Temp 97.5 øF (36.4 øC) (Temporal)   Resp 18   Ht 165 cm (64.96\")   Wt 94 kg (207 lb 3.2 oz)   SpO2 96%   BMI 34.52 kg/mý       Objective   Physical Exam  Vitals and nursing note reviewed.   Constitutional:       General: She is not in acute distress.     Appearance: Normal appearance. She is normal weight.   HENT:      Head: Normocephalic and atraumatic.      Right Ear: Tympanic membrane, ear canal and external ear normal.      Left Ear: Tympanic membrane, ear canal and external ear normal.      Nose: Nose normal.      Mouth/Throat:      Mouth: Mucous " membranes are moist.      Pharynx: Oropharynx is clear.   Eyes:      Extraocular Movements: Extraocular movements intact.      Conjunctiva/sclera: Conjunctivae normal.      Pupils: Pupils are equal, round, and reactive to light.   Neck:      Thyroid: No thyroid mass, thyromegaly or thyroid tenderness.   Cardiovascular:      Rate and Rhythm: Normal rate and regular rhythm.      Heart sounds: Normal heart sounds.   Pulmonary:      Effort: Pulmonary effort is normal. No respiratory distress.      Breath sounds: Normal breath sounds. No wheezing, rhonchi or rales.   Abdominal:      General: Abdomen is flat. Bowel sounds are normal. There is no distension.      Palpations: Abdomen is soft. There is no mass.      Tenderness: There is no abdominal tenderness.   Musculoskeletal:         General: Normal range of motion.      Cervical back: Normal range of motion and neck supple.      Right lower leg: No edema.      Left lower leg: No edema.   Skin:     General: Skin is warm and dry.   Neurological:      General: No focal deficit present.      Mental Status: She is alert and oriented to person, place, and time.   Psychiatric:         Mood and Affect: Mood normal.         Behavior: Behavior normal.         Thought Content: Thought content normal.       Procedures     Assessment    Diagnoses and all orders for this visit:    1. Encounter for routine history and physical examination (Primary)  Comments:  Work on exercising at least 150 minutes weekly and healthy diet.  Orders:  -     Lipid Panel; Future  -     Comprehensive Metabolic Panel; Future    2. Need for hepatitis C screening test  Comments:  Will check labs.  Orders:  -     Hepatitis C Antibody; Future    3. Encounter for screening colonoscopy  Comments:  Ordered today.  Orders:  -     Ambulatory Referral For Screening Colonoscopy    4. Arthritis  Comments:  continue meloxicam as needed.  Orders:  -     meloxicam (MOBIC) 15 MG tablet; Take 1 tablet by mouth Daily.   Dispense: 90 tablet; Refill: 3

## 2023-08-07 ENCOUNTER — LAB (OUTPATIENT)
Dept: LAB | Facility: HOSPITAL | Age: 63
End: 2023-08-07
Payer: COMMERCIAL

## 2023-08-07 ENCOUNTER — OFFICE VISIT (OUTPATIENT)
Dept: FAMILY MEDICINE CLINIC | Facility: CLINIC | Age: 63
End: 2023-08-07
Payer: COMMERCIAL

## 2023-08-07 VITALS
RESPIRATION RATE: 18 BRPM | HEIGHT: 65 IN | BODY MASS INDEX: 34.52 KG/M2 | OXYGEN SATURATION: 96 % | WEIGHT: 207.2 LBS | HEART RATE: 77 BPM | TEMPERATURE: 97.5 F | DIASTOLIC BLOOD PRESSURE: 82 MMHG | SYSTOLIC BLOOD PRESSURE: 128 MMHG

## 2023-08-07 DIAGNOSIS — Z12.11 ENCOUNTER FOR SCREENING COLONOSCOPY: ICD-10-CM

## 2023-08-07 DIAGNOSIS — M19.90 ARTHRITIS: ICD-10-CM

## 2023-08-07 DIAGNOSIS — Z11.59 NEED FOR HEPATITIS C SCREENING TEST: ICD-10-CM

## 2023-08-07 DIAGNOSIS — Z00.00 ENCOUNTER FOR ROUTINE HISTORY AND PHYSICAL EXAMINATION: Primary | ICD-10-CM

## 2023-08-07 DIAGNOSIS — Z00.00 ENCOUNTER FOR ROUTINE HISTORY AND PHYSICAL EXAMINATION: ICD-10-CM

## 2023-08-07 LAB
ALBUMIN SERPL-MCNC: 4 G/DL (ref 3.5–5.2)
ALBUMIN/GLOB SERPL: 1.5 G/DL
ALP SERPL-CCNC: 83 U/L (ref 39–117)
ALT SERPL W P-5'-P-CCNC: 27 U/L (ref 1–33)
ANION GAP SERPL CALCULATED.3IONS-SCNC: 11.3 MMOL/L (ref 5–15)
AST SERPL-CCNC: 24 U/L (ref 1–32)
BILIRUB SERPL-MCNC: 0.4 MG/DL (ref 0–1.2)
BUN SERPL-MCNC: 18 MG/DL (ref 8–23)
BUN/CREAT SERPL: 30.5 (ref 7–25)
CALCIUM SPEC-SCNC: 9.3 MG/DL (ref 8.6–10.5)
CHLORIDE SERPL-SCNC: 108 MMOL/L (ref 98–107)
CHOLEST SERPL-MCNC: 298 MG/DL (ref 0–200)
CO2 SERPL-SCNC: 21.7 MMOL/L (ref 22–29)
CREAT SERPL-MCNC: 0.59 MG/DL (ref 0.57–1)
EGFRCR SERPLBLD CKD-EPI 2021: 100.8 ML/MIN/1.73
GLOBULIN UR ELPH-MCNC: 2.6 GM/DL
GLUCOSE SERPL-MCNC: 108 MG/DL (ref 65–99)
HCV AB SER DONR QL: NORMAL
HDLC SERPL-MCNC: 43 MG/DL (ref 40–60)
LDLC SERPL CALC-MCNC: 230 MG/DL (ref 0–100)
LDLC/HDLC SERPL: 5.31 {RATIO}
POTASSIUM SERPL-SCNC: 4.3 MMOL/L (ref 3.5–5.2)
PROT SERPL-MCNC: 6.6 G/DL (ref 6–8.5)
SODIUM SERPL-SCNC: 141 MMOL/L (ref 136–145)
TRIGL SERPL-MCNC: 134 MG/DL (ref 0–150)
VLDLC SERPL-MCNC: 25 MG/DL (ref 5–40)

## 2023-08-07 PROCEDURE — 36415 COLL VENOUS BLD VENIPUNCTURE: CPT

## 2023-08-07 PROCEDURE — 80053 COMPREHEN METABOLIC PANEL: CPT

## 2023-08-07 PROCEDURE — 86803 HEPATITIS C AB TEST: CPT

## 2023-08-07 PROCEDURE — 99396 PREV VISIT EST AGE 40-64: CPT | Performed by: PHYSICIAN ASSISTANT

## 2023-08-07 PROCEDURE — 80061 LIPID PANEL: CPT

## 2023-08-07 RX ORDER — MELOXICAM 15 MG/1
15 TABLET ORAL DAILY
Qty: 90 TABLET | Refills: 3 | Status: SHIPPED | OUTPATIENT
Start: 2023-08-07

## 2023-08-10 DIAGNOSIS — E78.2 MIXED HYPERLIPIDEMIA: Primary | ICD-10-CM

## 2024-02-15 ENCOUNTER — TELEPHONE (OUTPATIENT)
Dept: FAMILY MEDICINE CLINIC | Facility: CLINIC | Age: 64
End: 2024-02-15
Payer: COMMERCIAL

## 2024-02-15 RX ORDER — DOXYCYCLINE HYCLATE 100 MG/1
100 CAPSULE ORAL 2 TIMES DAILY
Qty: 20 CAPSULE | Refills: 0 | Status: SHIPPED | OUTPATIENT
Start: 2024-02-15 | End: 2024-02-25

## 2024-02-16 ENCOUNTER — LAB (OUTPATIENT)
Dept: LAB | Facility: HOSPITAL | Age: 64
End: 2024-02-16
Payer: COMMERCIAL

## 2024-02-16 DIAGNOSIS — E89.0 POSTOPERATIVE HYPOTHYROIDISM: ICD-10-CM

## 2024-02-16 DIAGNOSIS — C73 THYROID CANCER: ICD-10-CM

## 2024-02-16 LAB
T4 FREE SERPL-MCNC: 1.56 NG/DL (ref 0.93–1.7)
TSH SERPL DL<=0.05 MIU/L-ACNC: 0.41 UIU/ML (ref 0.27–4.2)

## 2024-02-16 PROCEDURE — 84439 ASSAY OF FREE THYROXINE: CPT

## 2024-02-16 PROCEDURE — 36415 COLL VENOUS BLD VENIPUNCTURE: CPT

## 2024-02-16 PROCEDURE — 84443 ASSAY THYROID STIM HORMONE: CPT

## 2024-02-20 ENCOUNTER — LAB (OUTPATIENT)
Dept: LAB | Facility: HOSPITAL | Age: 64
End: 2024-02-20
Payer: COMMERCIAL

## 2024-02-20 ENCOUNTER — OFFICE VISIT (OUTPATIENT)
Dept: ENDOCRINOLOGY | Facility: CLINIC | Age: 64
End: 2024-02-20
Payer: COMMERCIAL

## 2024-02-20 VITALS
BODY MASS INDEX: 33.82 KG/M2 | SYSTOLIC BLOOD PRESSURE: 130 MMHG | DIASTOLIC BLOOD PRESSURE: 80 MMHG | HEART RATE: 96 BPM | HEIGHT: 65 IN | WEIGHT: 203 LBS | OXYGEN SATURATION: 95 %

## 2024-02-20 DIAGNOSIS — C73 THYROID CANCER: ICD-10-CM

## 2024-02-20 DIAGNOSIS — E89.0 POSTOPERATIVE HYPOTHYROIDISM: ICD-10-CM

## 2024-02-20 DIAGNOSIS — C73 THYROID CANCER: Primary | ICD-10-CM

## 2024-02-20 LAB
ALBUMIN SERPL-MCNC: 4.3 G/DL (ref 3.5–5.2)
ALBUMIN/GLOB SERPL: 1.5 G/DL
ALP SERPL-CCNC: 100 U/L (ref 39–117)
ALT SERPL W P-5'-P-CCNC: 29 U/L (ref 1–33)
ANION GAP SERPL CALCULATED.3IONS-SCNC: 13.4 MMOL/L (ref 5–15)
AST SERPL-CCNC: 29 U/L (ref 1–32)
BILIRUB SERPL-MCNC: <0.2 MG/DL (ref 0–1.2)
BUN SERPL-MCNC: 12 MG/DL (ref 8–23)
BUN/CREAT SERPL: 15.6 (ref 7–25)
CALCIUM SPEC-SCNC: 9.5 MG/DL (ref 8.6–10.5)
CHLORIDE SERPL-SCNC: 105 MMOL/L (ref 98–107)
CO2 SERPL-SCNC: 22.6 MMOL/L (ref 22–29)
CREAT SERPL-MCNC: 0.77 MG/DL (ref 0.57–1)
EGFRCR SERPLBLD CKD-EPI 2021: 86.3 ML/MIN/1.73
GLOBULIN UR ELPH-MCNC: 2.8 GM/DL
GLUCOSE SERPL-MCNC: 119 MG/DL (ref 65–99)
POTASSIUM SERPL-SCNC: 3.8 MMOL/L (ref 3.5–5.2)
PROT SERPL-MCNC: 7.1 G/DL (ref 6–8.5)
SODIUM SERPL-SCNC: 141 MMOL/L (ref 136–145)

## 2024-02-20 PROCEDURE — 80053 COMPREHEN METABOLIC PANEL: CPT

## 2024-02-20 PROCEDURE — 99214 OFFICE O/P EST MOD 30 MIN: CPT | Performed by: INTERNAL MEDICINE

## 2024-02-20 PROCEDURE — 86800 THYROGLOBULIN ANTIBODY: CPT

## 2024-02-20 PROCEDURE — 84432 ASSAY OF THYROGLOBULIN: CPT

## 2024-02-20 PROCEDURE — 36415 COLL VENOUS BLD VENIPUNCTURE: CPT

## 2024-02-20 NOTE — PROGRESS NOTES
Endocrine Progress Note Outpatient     Patient Care Team:  Karen Glynn MD as PCP - Neyda Salgado MD as Consulting Physician (Surgical Oncology)  Miguel Angel Anaya MD as Consulting Physician (Radiation Oncology)  Ede Bhatti MD as Consulting Physician (Obstetrics and Gynecology)    Chief Complaint: Follow-up thyroid nodule/thyroid cancer      HPI: This is a 63-year-old female with prior history of hypertension was seen here back in September for multiple thyroid nodules and they were suspicious looking.  She subsequently had biopsy followed by total thyroidectomy which was done on November 10, 2022 at [facility] with Dr. Neyda Luz.  Postsurgical path did show multifocal PTC with 3 out of 5 lymph nodes positive.  She is currently on levothyroxine 137 mcg p.o. daily.    I reviewed her pathology report: She did have multifocal papillary thyroid cancer in the thyroid with a largest foci was 3.1 cm and the second largest was 1.7 cm.  She did have classical papillary thyroid cancer on the largest 1 and to have 3 out of 5 lymph nodes positive.      Recommend radioactive iodine treatment on January 25, 2023 with 102 mCi of I-131 followed by whole-body scan on February 1, 2023 which did show a uptake in the neck area as expected but there was no uptake anywhere else in the body according to that scan.    Past Medical History:   Diagnosis Date    Arthritis     Hypertension     Thyroid cancer 08/2022    Thyroidectomy 11/10/2022       Social History     Socioeconomic History    Marital status:      Spouse name: Mu    Number of children: 4    Years of education: 12    Highest education level: Master's degree (e.g., MA, MS, Augustus, MEd, MSW, YESENIA)   Tobacco Use    Smoking status: Former     Packs/day: 0.25     Years: 1.00     Additional pack years: 0.00     Total pack years: 0.25     Types: Cigarettes    Smokeless tobacco: Never    Tobacco comments:     At 18 for 3 months,   at 54 6 months   Vaping Use    Vaping Use: Never used   Substance and Sexual Activity    Alcohol use: Yes     Alcohol/week: 7.0 standard drinks of alcohol     Types: 5 Cans of beer, 2 Drinks containing 0.5 oz of alcohol per week    Drug use: Never    Sexual activity: Yes     Partners: Male     Birth control/protection: None       Family History   Problem Relation Age of Onset    Hypertension Mother     Diabetes Mother     Heart disease Mother     Hyperlipidemia Mother     Cancer Father         Prostate and ema    Stroke Brother        Allergies   Allergen Reactions    Penicillins Hives       ROS:   Constitutional:  Denies fatigue, tiredness.    Eyes:  Denies change in visual acuity   HENT:  Denies nasal congestion or sore throat   Respiratory: denies cough, shortness of breath.   Cardiovascular:  denies chest pain, edema   GI:  Denies abdominal pain, nausea, vomiting.   Musculoskeletal:  Denies back pain or joint pain   Integument:  Denies dry skin and rash   Neurologic:  Denies headache, focal weakness or sensory changes   Endocrine:  Denies polyuria or polydipsia   Psychiatric:  Denies depression or anxiety      Vitals:    02/20/24 1515   BP: 130/80   Pulse: 96   SpO2: 95%     Body mass index is 33.82 kg/m².     Physical Exam:  GEN: NAD, conversant  EYES: EOMI,   NECK: no thyromegaly,   CV: RRR,   LUNG: CTA  PSYCH: AOX3, appropriate mood, affect normal      Results Review:     I reviewed the patient's new clinical results.    Lab Results   Component Value Date    HGBA1C 5.4 05/03/2022    HGBA1C 5.2 11/06/2019      Lab Results   Component Value Date    GLUCOSE 108 (H) 08/07/2023    BUN 18 08/07/2023    CREATININE 0.59 08/07/2023    EGFRIFNONA 94 09/22/2020    EGFRIFAFRI >60 11/08/2022    BCR 30.5 (H) 08/07/2023    K 4.3 08/07/2023    CO2 21.7 (L) 08/07/2023    CALCIUM 9.3 08/07/2023    ALBUMIN 4.0 08/07/2023    LABIL2 1.4 11/08/2022    AST 24 08/07/2023    ALT 27 08/07/2023    CHOL 298 (H) 08/07/2023    TRIG 134  08/07/2023     (H) 08/07/2023    HDL 43 08/07/2023     Lab Results   Component Value Date    TSH 0.411 02/16/2024    FREET4 1.56 02/16/2024    THYROIDAB <8 09/19/2022     THYROGLOBULIN WITH ANTI-TG W REFLEX (08/25/2023 13:39)    TSH+Free T4 (02/16/2024 12:50)    THYROGLOBULIN WITH ANTI-TG W REFLEX (08/25/2023 13:39)    TSH+Free T4 (03/20/2023 09:32)   Comprehensive Metabolic Panel (03/20/2023 09:32)   Comprehensive Thyroglobulin (03/20/2023 09:32)       ENDOCRINOLOGY - SCAN - THYROID GLAND RESECTION, ULP, 11/23/2022 (11/23/2022)     - THYROGLOBULIN WITH ANTI-TG W REFLEX (01/25/2023 09:59)   Comprehensive Thyroglobulin (12/09/2022 10:08)   Miscellaneous Test (11/23/2022 11:55)       Medication Review: Reviewed.       Current Outpatient Medications:     Cholecalciferol (Vitamin D-3) 125 MCG (5000 UT) tablet, Take  by mouth., Disp: , Rfl:     doxycycline (VIBRAMYCIN) 100 MG capsule, Take 1 capsule by mouth 2 (Two) Times a Day for 10 days., Disp: 20 capsule, Rfl: 0    levothyroxine (SYNTHROID, LEVOTHROID) 137 MCG tablet, Take 1 tablet p.o. daily., Disp: 90 tablet, Rfl: 4    meloxicam (MOBIC) 15 MG tablet, Take 1 tablet by mouth Daily., Disp: 90 tablet, Rfl: 3    POTASSIUM BICARBONATE PO, 0 Refill(s), Disp: , Rfl:           Assessment and plan:  Papillary thyroid cancer: This is a 62-year-old female who was initially found to have suspicious thyroid nodule, subsequently had thyroid surgery showed multifocal PTC with 3 out of 5 lymph nodes positive.  Had radioactive iodine treatment with 102 mCi in January 2023 followed by whole-body scan in February 2023 which did show an uptake in this neck.  TG level is declining, last TG level was 0.2 with negative antibodies back in August 2023.    Hypothyroidism: TSH and free T4 normal, will continue levothyroxine 137 mcg p.o. daily.    Assessment and plan from June 2, 2023 reviewed and updated.           Vasdev Lohano, MD FACE.

## 2024-02-20 NOTE — PATIENT INSTRUCTIONS
Continue levothyroxine at 137 mcg p.o. daily  Check CMP and TG panel today  Check TSH and free T4 before follow-up in 6 months.

## 2024-02-27 LAB
THYROGLOB AB SERPL-ACNC: 1.3 IU/ML
THYROGLOB SERPL-MCNC: <2 NG/ML
THYROGLOB SERPL-MCNC: ABNORMAL NG/ML

## 2024-02-29 DIAGNOSIS — E89.0 POSTOPERATIVE HYPOTHYROIDISM: ICD-10-CM

## 2024-02-29 DIAGNOSIS — C73 THYROID CANCER: Primary | ICD-10-CM

## 2024-02-29 DIAGNOSIS — E04.2 MULTIPLE THYROID NODULES: ICD-10-CM

## 2024-03-01 ENCOUNTER — TELEPHONE (OUTPATIENT)
Dept: ENDOCRINOLOGY | Facility: CLINIC | Age: 64
End: 2024-03-01
Payer: COMMERCIAL

## 2024-03-01 NOTE — TELEPHONE ENCOUNTER
Caller: Rosalia Gamino    Relationship: Self    Best call back number: [phone]    What orders are you requesting (i.e. lab or imaging): LABS    In what timeframe would the patient need to come in: PT STATED THAT SHE GOT A MESSAGE STATING THAT SHE NEEDS TO DO A RETEST WITHIN 6-8 WEEKS.     Where will you receive your lab/imaging services: AT THIS PRACTICE AND SHE NEEDS TO GET IT SCHEDULED.     Additional notes: PT WOULD LIKE FOR DR. CHICAS TO REVIEW THE INFORMATION FROM DR. PEARSON REGARDING HER BODY SCAN AND BLOOD WORK. PT WAS ADVISED TO COME OFF HER BIOTIN BEFORE HER TESTING.

## 2024-03-07 ENCOUNTER — TELEPHONE (OUTPATIENT)
Dept: ENDOCRINOLOGY | Facility: CLINIC | Age: 64
End: 2024-03-07

## 2024-03-07 NOTE — TELEPHONE ENCOUNTER
From Angelica     Pt is confused about why she needs to retest, TG she had 1 done Aug 2023. She is asking if the antibiotics Doxycycline could have altered her Blood Work.  April 19 for labs. Please advise

## 2024-04-18 ENCOUNTER — LAB (OUTPATIENT)
Dept: LAB | Facility: HOSPITAL | Age: 64
End: 2024-04-18
Payer: MEDICARE

## 2024-04-18 DIAGNOSIS — E04.2 MULTIPLE THYROID NODULES: ICD-10-CM

## 2024-04-18 DIAGNOSIS — E89.0 POSTOPERATIVE HYPOTHYROIDISM: ICD-10-CM

## 2024-04-18 DIAGNOSIS — C73 THYROID CANCER: ICD-10-CM

## 2024-04-18 PROCEDURE — 36415 COLL VENOUS BLD VENIPUNCTURE: CPT

## 2024-04-18 PROCEDURE — 86800 THYROGLOBULIN ANTIBODY: CPT

## 2024-04-18 PROCEDURE — 84432 ASSAY OF THYROGLOBULIN: CPT

## 2024-04-24 LAB
THYROGLOB AB SERPL-ACNC: <1 IU/ML
THYROGLOB SERPL-MCNC: <0.1 NG/ML
THYROGLOB SERPL-MCNC: NORMAL NG/ML

## 2024-07-18 ENCOUNTER — TELEPHONE (OUTPATIENT)
Dept: FAMILY MEDICINE CLINIC | Facility: CLINIC | Age: 64
End: 2024-07-18
Payer: MEDICARE

## 2024-07-18 RX ORDER — AMLODIPINE BESYLATE 5 MG/1
5 TABLET ORAL DAILY
COMMUNITY

## 2024-07-18 NOTE — TELEPHONE ENCOUNTER
Pt called and left voicemail on 07/18/2024. Pt states that b/p has been high.Spoke with pt.  On 07/17/2024 her b/p was 200/103. Pt starting taking amLODIPine again on her own on 07/18/2024. Pt b/p was 168/113 on 07/18/2024. Pt has some dizziness but no other symptoms. Pt will be in on 08/13/2024 for appt.

## 2024-07-18 NOTE — TELEPHONE ENCOUNTER
I would like her to continue to monitor it. Let me know how it is in a week.  If still elevated, will increase to 10mg.  Does she need more amlodipine sent or does she have enough to last a little while?

## 2024-08-13 ENCOUNTER — OFFICE VISIT (OUTPATIENT)
Dept: FAMILY MEDICINE CLINIC | Facility: CLINIC | Age: 64
End: 2024-08-13
Payer: MEDICARE

## 2024-08-13 VITALS
TEMPERATURE: 97.7 F | RESPIRATION RATE: 18 BRPM | DIASTOLIC BLOOD PRESSURE: 89 MMHG | OXYGEN SATURATION: 96 % | HEIGHT: 65 IN | BODY MASS INDEX: 33.26 KG/M2 | SYSTOLIC BLOOD PRESSURE: 147 MMHG | HEART RATE: 85 BPM | WEIGHT: 199.6 LBS

## 2024-08-13 DIAGNOSIS — I10 PRIMARY HYPERTENSION: ICD-10-CM

## 2024-08-13 DIAGNOSIS — Z00.00 WELCOME TO MEDICARE PREVENTIVE VISIT: Primary | ICD-10-CM

## 2024-08-13 DIAGNOSIS — E55.9 VITAMIN D DEFICIENCY: ICD-10-CM

## 2024-08-13 DIAGNOSIS — Z78.0 ENCOUNTER FOR OSTEOPOROSIS SCREENING IN ASYMPTOMATIC POSTMENOPAUSAL PATIENT: ICD-10-CM

## 2024-08-13 DIAGNOSIS — E78.00 PURE HYPERCHOLESTEROLEMIA: ICD-10-CM

## 2024-08-13 DIAGNOSIS — Z13.820 ENCOUNTER FOR OSTEOPOROSIS SCREENING IN ASYMPTOMATIC POSTMENOPAUSAL PATIENT: ICD-10-CM

## 2024-08-13 DIAGNOSIS — M19.90 ARTHRITIS: ICD-10-CM

## 2024-08-13 PROBLEM — Z80.0 FAMILY HISTORY OF MALIGNANT NEOPLASM OF COLON: Status: ACTIVE | Noted: 2024-08-13

## 2024-08-13 RX ORDER — MELOXICAM 15 MG/1
15 TABLET ORAL DAILY
Qty: 90 TABLET | Refills: 3 | Status: SHIPPED | OUTPATIENT
Start: 2024-08-13

## 2024-08-13 RX ORDER — AMLODIPINE BESYLATE 10 MG/1
10 TABLET ORAL DAILY
Qty: 30 TABLET | Refills: 12 | Status: SHIPPED | OUTPATIENT
Start: 2024-08-13

## 2024-08-13 NOTE — PROGRESS NOTES
Subjective   The ABCs of the Annual Wellness Visit  Medicare Wellness Visit      Rosalia Gamino is a 64 y.o. patient who presents for a Medicare Wellness Visit.    The following portions of the patient's history were reviewed and   updated as appropriate: allergies, current medications, past family history, past medical history, past social history, past surgical history, and problem list.    Compared to one year ago, the patient's physical   health is better.  Has mammogram scheduled 8/23/24.  Has appointment with oncologist soon.  Sees Dr. Wright every 6 months.  Compared to one year ago, the patient's mental   health is the same.    Recent Hospitalizations:  She was not admitted to the hospital during the last year.     Current Medical Providers:  Patient Care Team:  Karen Glynn MD as PCP - Neyda Salgado MD as Consulting Physician (Surgical Oncology)  Miguel Angel Anaya MD as Consulting Physician (Radiation Oncology)  Ede Bhatti MD as Consulting Physician (Obstetrics and Gynecology)    Outpatient Medications Prior to Visit   Medication Sig Dispense Refill    levothyroxine (SYNTHROID, LEVOTHROID) 137 MCG tablet Take 1 tablet p.o. daily. 90 tablet 4    amLODIPine (NORVASC) 5 MG tablet Take 1 tablet by mouth Daily.      meloxicam (MOBIC) 15 MG tablet Take 1 tablet by mouth Daily. 90 tablet 3    Cholecalciferol (Vitamin D-3) 125 MCG (5000 UT) tablet Take  by mouth.      POTASSIUM BICARBONATE PO 0 Refill(s) (Patient not taking: Reported on 8/13/2024)       No facility-administered medications prior to visit.     No opioid medication identified on active medication list. I have reviewed chart for other potential  high risk medication/s and harmful drug interactions in the elderly.      Aspirin is not on active medication list.  Aspirin use is not indicated based on review of current medical condition/s. Risk of harm outweighs potential benefits.  .    Patient Active Problem List  "  Diagnosis    Arthritis    Encounter for immunization    Hemorrhoids    Hyperglycemia    Hyperlipidemia    Primary hypertension    Obesity    Multiple thyroid nodules    Thyroid cancer    Postoperative hypothyroidism    Family history of malignant neoplasm of colon     Advance Care Planning Advance Directive is not on file.  ACP discussion was held with the patient during this visit. Patient has an advance directive (not in EMR), copy requested.            Objective   Vitals:    08/13/24 1033   BP: 147/89   BP Location: Left arm   Patient Position: Sitting   Cuff Size: Large Adult   Pulse: 85   Resp: 18   Temp: 97.7 °F (36.5 °C)   TempSrc: Temporal   SpO2: 96%   Weight: 90.5 kg (199 lb 9.6 oz)   Height: 165 cm (64.96\")   PainSc:   4   PainLoc: Ankle       Estimated body mass index is 33.26 kg/m² as calculated from the following:    Height as of this encounter: 165 cm (64.96\").    Weight as of this encounter: 90.5 kg (199 lb 9.6 oz).    BMI is >= 30 and <35. (Class 1 Obesity). The following options were offered after discussion;: exercise counseling/recommendations and nutrition counseling/recommendations         Gait and Balance Evaluation:  Normal  Does the patient have evidence of cognitive impairment? No                                                                                               Health  Risk Assessment    Smoking Status:  Social History     Tobacco Use   Smoking Status Former    Current packs/day: 0.25    Average packs/day: 0.3 packs/day for 1.2 years (0.3 ttl pk-yrs)    Types: Cigarettes   Smokeless Tobacco Never   Tobacco Comments    At 18 for 3 months,  at 54 6 months     Alcohol Consumption:  Social History     Substance and Sexual Activity   Alcohol Use Yes    Alcohol/week: 4.0 standard drinks of alcohol    Types: 2 Cans of beer, 2 Drinks containing 0.5 oz of alcohol per week       Fall Risk Screen  STEADI Fall Risk Assessment was completed, and patient is at MODERATE risk for falls. " Assessment completed on:2024    Depression Screenin/13/2024    10:35 AM   PHQ-2/PHQ-9 Depression Screening   Little Interest or Pleasure in Doing Things 0-->not at all   Feeling Down, Depressed or Hopeless 0-->not at all   PHQ-9: Brief Depression Severity Measure Score 0     Health Habits and Functional and Cognitive Screenin/11/2024    10:58 AM   Functional & Cognitive Status   Do you have difficulty preparing food and eating? No   Do you have difficulty bathing yourself, getting dressed or grooming yourself? No   Do you have difficulty using the toilet? No   Do you have difficulty moving around from place to place? No   Do you have trouble with steps or getting out of a bed or a chair? No   Current Diet Well Balanced Diet   Dental Exam Up to date   Eye Exam Up to date   Exercise (times per week) 2 times per week   Current Exercises Include Swimming   Do you need help using the phone?  No   Are you deaf or do you have serious difficulty hearing?  No   Do you need help to go to places out of walking distance? No   Do you need help shopping? No   Do you need help preparing meals?  No   Do you need help with housework?  No   Do you need help with laundry? No   Do you need help taking your medications? No   Do you need help managing money? No   Do you ever drive or ride in a car without wearing a seat belt? No   Have you felt unusual stress, anger or loneliness in the last month? Yes   Who do you live with? Spouse   If you need help, do you have trouble finding someone available to you? No   Have you been bothered in the last four weeks by sexual problems? No   Do you have difficulty concentrating, remembering or making decisions? No           Visual Acuity:  Vision Screening    Right eye Left eye Both eyes   Without correction      With correction 20/20 20/20 20/20     Age-appropriate Screening Schedule:  Refer to the list below for future screening recommendations based on patient's age, sex  and/or medical conditions. Orders for these recommended tests are listed in the plan section. The patient has been provided with a written plan.    Health Maintenance List  Health Maintenance   Topic Date Due    DXA SCAN  Never done    BMI FOLLOWUP  Never done    ZOSTER VACCINE (1 of 2) Never done    COLORECTAL CANCER SCREENING  02/28/2022    PAP SMEAR  11/06/2022    COVID-19 Vaccine (3 - 2023-24 season) 09/01/2023    Pneumococcal Vaccine 65+ (1 of 1 - PCV) Never done    MAMMOGRAM  05/12/2024    LIPID PANEL  08/07/2024    INFLUENZA VACCINE  08/01/2024    TDAP/TD VACCINES (2 - Td or Tdap) 03/11/2025    ANNUAL WELLNESS VISIT  08/13/2025    HEPATITIS C SCREENING  Completed                                                                                                                                                CMS Preventative Services Quick Reference  Risk Factors Identified During Encounter  Chronic Pain: NSAIDs per medication orders.  Immunizations Discussed/Encouraged: Prevnar 20 (Pneumococcal 20-valent conjugate) and Shingrix    The above risks/problems have been discussed with the patient.  Pertinent information has been shared with the patient in the After Visit Summary.  An After Visit Summary and PPPS were made available to the patient.    Follow Up:   Next Medicare Wellness visit to be scheduled in 1 year.     Diagnoses and all orders for this visit:    1. Welcome to Medicare preventive visit (Primary)  Comments:  Continue to work on staying active and exercising at least 150 minutes per week.  Work on healthy diet.  Orders:  -     COGNITIVE ASSESSMENT SCAN    2. Encounter for osteoporosis screening in asymptomatic postmenopausal patient  Comments:  Orders entered to have [facility] at [facility].  Already has orders for mammogram.  Has paperwork for colonoscopy already.  Orders:  -     DEXA Bone Density Axial; Future    3. Pure hypercholesterolemia  Comments:  Continue to work on low saturated fat  and high fiber diet. Discussed possibly doing CT cardiac calcium score if cholesterol is still high. Will wait on this  Assessment & Plan:       Orders:  -     Lipid Panel; Future  -     Comprehensive metabolic panel; Future    4. Vitamin D deficiency  Comments:  Will check labs.  Orders:  -     Vitamin D 25 hydroxy; Future    5. Arthritis  Comments:  continue meloxicam as needed.  Orders:  -     meloxicam (MOBIC) 15 MG tablet; Take 1 tablet by mouth Daily.  Dispense: 90 tablet; Refill: 3    6. Primary hypertension  Comments:  Increase amlodipine to 10mg daily.  Work on low sodium diet and continue exercise. Goal is <140/90. Let me know how bp is in 1 week.  Assessment & Plan:      Orders:  -     amLODIPine (NORVASC) 10 MG tablet; Take 1 tablet by mouth Daily.  Dispense: 30 tablet; Refill: 12       Orders Placed This Encounter   Procedures    DEXA Bone Density Axial    Lipid Panel    Comprehensive metabolic panel    Vitamin D 25 hydroxy    COGNITIVE ASSESSMENT SCAN     New Medications Ordered This Visit   Medications    amLODIPine (NORVASC) 10 MG tablet     Sig: Take 1 tablet by mouth Daily.     Dispense:  30 tablet     Refill:  12    meloxicam (MOBIC) 15 MG tablet     Sig: Take 1 tablet by mouth Daily.     Dispense:  90 tablet     Refill:  3          Follow Up:   No follow-ups on file.

## 2024-08-14 ENCOUNTER — LAB (OUTPATIENT)
Dept: LAB | Facility: HOSPITAL | Age: 64
End: 2024-08-14
Payer: MEDICARE

## 2024-08-14 DIAGNOSIS — E78.00 PURE HYPERCHOLESTEROLEMIA: ICD-10-CM

## 2024-08-14 DIAGNOSIS — E55.9 VITAMIN D DEFICIENCY: ICD-10-CM

## 2024-08-14 LAB
25(OH)D3 SERPL-MCNC: 53.6 NG/ML (ref 30–100)
ALBUMIN SERPL-MCNC: 4.1 G/DL (ref 3.5–5.2)
ALBUMIN/GLOB SERPL: 1.5 G/DL
ALP SERPL-CCNC: 95 U/L (ref 39–117)
ALT SERPL W P-5'-P-CCNC: 31 U/L (ref 1–33)
ANION GAP SERPL CALCULATED.3IONS-SCNC: 13.1 MMOL/L (ref 5–15)
AST SERPL-CCNC: 23 U/L (ref 1–32)
BILIRUB SERPL-MCNC: 0.4 MG/DL (ref 0–1.2)
BUN SERPL-MCNC: 15 MG/DL (ref 8–23)
BUN/CREAT SERPL: 29.4 (ref 7–25)
CALCIUM SPEC-SCNC: 9.6 MG/DL (ref 8.6–10.5)
CHLORIDE SERPL-SCNC: 106 MMOL/L (ref 98–107)
CHOLEST SERPL-MCNC: 276 MG/DL (ref 0–200)
CO2 SERPL-SCNC: 23.9 MMOL/L (ref 22–29)
CREAT SERPL-MCNC: 0.51 MG/DL (ref 0.57–1)
EGFRCR SERPLBLD CKD-EPI 2021: 103.7 ML/MIN/1.73
GLOBULIN UR ELPH-MCNC: 2.7 GM/DL
GLUCOSE SERPL-MCNC: 96 MG/DL (ref 65–99)
HDLC SERPL-MCNC: 42 MG/DL (ref 40–60)
LDLC SERPL CALC-MCNC: 187 MG/DL (ref 0–100)
LDLC/HDLC SERPL: 4.42 {RATIO}
POTASSIUM SERPL-SCNC: 4.2 MMOL/L (ref 3.5–5.2)
PROT SERPL-MCNC: 6.8 G/DL (ref 6–8.5)
SODIUM SERPL-SCNC: 143 MMOL/L (ref 136–145)
TRIGL SERPL-MCNC: 242 MG/DL (ref 0–150)
VLDLC SERPL-MCNC: 47 MG/DL (ref 5–40)

## 2024-08-14 PROCEDURE — 80053 COMPREHEN METABOLIC PANEL: CPT

## 2024-08-14 PROCEDURE — 80061 LIPID PANEL: CPT

## 2024-08-14 PROCEDURE — 36415 COLL VENOUS BLD VENIPUNCTURE: CPT

## 2024-08-14 PROCEDURE — 82306 VITAMIN D 25 HYDROXY: CPT

## 2024-08-16 ENCOUNTER — PATIENT MESSAGE (OUTPATIENT)
Dept: FAMILY MEDICINE CLINIC | Facility: CLINIC | Age: 64
End: 2024-08-16
Payer: MEDICARE

## 2024-08-16 DIAGNOSIS — Z13.6 SCREENING FOR HEART DISEASE: Primary | ICD-10-CM

## 2024-08-18 DIAGNOSIS — M19.90 ARTHRITIS: ICD-10-CM

## 2024-08-19 RX ORDER — MELOXICAM 15 MG/1
15 TABLET ORAL DAILY
Qty: 90 TABLET | Refills: 3 | OUTPATIENT
Start: 2024-08-19

## 2024-08-27 RX ORDER — LEVOTHYROXINE SODIUM 137 UG/1
TABLET ORAL
Qty: 90 TABLET | Refills: 4 | Status: SHIPPED | OUTPATIENT
Start: 2024-08-27

## 2024-09-03 ENCOUNTER — LAB (OUTPATIENT)
Dept: LAB | Facility: HOSPITAL | Age: 64
End: 2024-09-03
Payer: MEDICARE

## 2024-09-03 DIAGNOSIS — E89.0 POSTOPERATIVE HYPOTHYROIDISM: ICD-10-CM

## 2024-09-03 DIAGNOSIS — C73 THYROID CANCER: ICD-10-CM

## 2024-09-03 LAB
T4 FREE SERPL-MCNC: 1.84 NG/DL (ref 0.93–1.7)
TSH SERPL DL<=0.05 MIU/L-ACNC: 0.07 UIU/ML (ref 0.27–4.2)

## 2024-09-03 PROCEDURE — 84443 ASSAY THYROID STIM HORMONE: CPT

## 2024-09-03 PROCEDURE — 84439 ASSAY OF FREE THYROXINE: CPT

## 2024-09-03 PROCEDURE — 36415 COLL VENOUS BLD VENIPUNCTURE: CPT

## 2024-09-10 ENCOUNTER — OFFICE VISIT (OUTPATIENT)
Dept: ENDOCRINOLOGY | Facility: CLINIC | Age: 64
End: 2024-09-10
Payer: MEDICARE

## 2024-09-10 ENCOUNTER — HOSPITAL ENCOUNTER (OUTPATIENT)
Dept: CT IMAGING | Facility: HOSPITAL | Age: 64
Discharge: HOME | End: 2024-09-10
Admitting: FAMILY MEDICINE

## 2024-09-10 VITALS
OXYGEN SATURATION: 98 % | HEIGHT: 65 IN | SYSTOLIC BLOOD PRESSURE: 140 MMHG | BODY MASS INDEX: 33.66 KG/M2 | WEIGHT: 202 LBS | HEART RATE: 80 BPM | DIASTOLIC BLOOD PRESSURE: 90 MMHG

## 2024-09-10 DIAGNOSIS — E89.0 POSTOPERATIVE HYPOTHYROIDISM: ICD-10-CM

## 2024-09-10 DIAGNOSIS — Z13.6 SCREENING FOR HEART DISEASE: ICD-10-CM

## 2024-09-10 DIAGNOSIS — C73 THYROID CANCER: Primary | ICD-10-CM

## 2024-09-10 PROCEDURE — 75571 CT HRT W/O DYE W/CA TEST: CPT

## 2024-09-10 PROCEDURE — 3077F SYST BP >= 140 MM HG: CPT | Performed by: INTERNAL MEDICINE

## 2024-09-10 PROCEDURE — 1160F RVW MEDS BY RX/DR IN RCRD: CPT | Performed by: INTERNAL MEDICINE

## 2024-09-10 PROCEDURE — G2211 COMPLEX E/M VISIT ADD ON: HCPCS | Performed by: INTERNAL MEDICINE

## 2024-09-10 PROCEDURE — 3080F DIAST BP >= 90 MM HG: CPT | Performed by: INTERNAL MEDICINE

## 2024-09-10 PROCEDURE — 1159F MED LIST DOCD IN RCRD: CPT | Performed by: INTERNAL MEDICINE

## 2024-09-10 PROCEDURE — 99214 OFFICE O/P EST MOD 30 MIN: CPT | Performed by: INTERNAL MEDICINE

## 2024-09-10 RX ORDER — LEVOTHYROXINE SODIUM 125 UG/1
TABLET ORAL
Qty: 90 TABLET | Refills: 2 | Status: SHIPPED | OUTPATIENT
Start: 2024-09-10

## 2024-09-10 RX ORDER — LEVOTHYROXINE SODIUM 125 UG/1
TABLET ORAL
Qty: 30 TABLET | Refills: 6 | Status: SHIPPED | OUTPATIENT
Start: 2024-09-10 | End: 2024-09-10

## 2024-09-10 NOTE — PROGRESS NOTES
Endocrine Progress Note Outpatient     Patient Care Team:  Karen Glynn MD as PCP - General  Neyda Luz MD as Consulting Physician (Surgical Oncology)  Miguel Angel Anaya MD as Consulting Physician (Radiation Oncology)  Ede Bhatti MD as Consulting Physician (Obstetrics and Gynecology)    Chief Complaint: Follow-up thyroid nodule/thyroid cancer    HPI: This is a 64-year-old female with prior history of hypertension was seen here back in September for multiple thyroid nodules and they were suspicious looking.  She subsequently had biopsy followed by total thyroidectomy which was done on November 10, 2022 at [facility] with Dr. Neyda Luz.  Postsurgical path did show multifocal PTC with 3 out of 5 lymph nodes positive.  She is currently on levothyroxine 137 mcg p.o. daily.    I reviewed her pathology report: She did have multifocal papillary thyroid cancer in the thyroid with a largest foci was 3.1 cm and the second largest was 1.7 cm.  She did have classical papillary thyroid cancer on the largest 1 and to have 3 out of 5 lymph nodes positive.      Underwent radioactive iodine treatment on January 25, 2023 with 102 mCi of I-131 followed by whole-body scan on February 1, 2023 which did show a uptake in the neck area as expected but there was no uptake anywhere else in the body according to that scan.    Past Medical History:   Diagnosis Date    Arthritis     Hypertension     Thyroid cancer 08/2022    Thyroidectomy 11/10/2022       Social History     Socioeconomic History    Marital status:      Spouse name: Mu    Number of children: 4    Years of education: 12    Highest education level: Master's degree (e.g., MA, MS, Augustus, MEd, MSW, YESENIA)   Tobacco Use    Smoking status: Former     Current packs/day: 0.25     Average packs/day: 0.3 packs/day for 1.2 years (0.3 ttl pk-yrs)     Types: Cigarettes    Smokeless tobacco: Never    Tobacco comments:     At 18 for 3 months,   at 54 6 months   Vaping Use    Vaping status: Never Used   Substance and Sexual Activity    Alcohol use: Yes     Alcohol/week: 4.0 standard drinks of alcohol     Types: 2 Cans of beer, 2 Drinks containing 0.5 oz of alcohol per week    Drug use: Never    Sexual activity: Yes     Partners: Male     Birth control/protection: None       Family History   Problem Relation Age of Onset    Hypertension Mother     Diabetes Mother     Heart disease Mother     Hyperlipidemia Mother     Cancer Father         Prostate and ema    Arthritis Father     Stroke Brother        Allergies   Allergen Reactions    Penicillins Hives       ROS:   Constitutional:  Denies fatigue, tiredness.    Eyes:  Denies change in visual acuity   HENT:  Denies nasal congestion or sore throat   Respiratory: denies cough, shortness of breath.   Cardiovascular:  denies chest pain, edema   GI:  Denies abdominal pain, nausea, vomiting.   Musculoskeletal:  Denies back pain or joint pain   Integument:  Denies dry skin and rash   Neurologic:  Denies headache, focal weakness or sensory changes   Endocrine:  Denies polyuria or polydipsia   Psychiatric:  Denies depression or anxiety      Vitals:    09/10/24 1054   BP: 140/90   Pulse: 80   SpO2: 98%     Body mass index is 33.66 kg/m².     Physical Exam:  GEN: NAD, conversant  EYES: EOMI,   NECK: no thyromegaly,   CV: RRR,   LUNG: CTA  PSYCH: AOX3, appropriate mood, affect normal      Results Review:     I reviewed the patient's new clinical results.    Lab Results   Component Value Date    HGBA1C 5.4 05/03/2022    HGBA1C 5.2 11/06/2019      Lab Results   Component Value Date    GLUCOSE 96 08/14/2024    BUN 15 08/14/2024    CREATININE 0.51 (L) 08/14/2024    EGFRIFNONA 94 09/22/2020    EGFRIFAFRI >60 11/08/2022    BCR 29.4 (H) 08/14/2024    K 4.2 08/14/2024    CO2 23.9 08/14/2024    CALCIUM 9.6 08/14/2024    ALBUMIN 4.1 08/14/2024    LABIL2 1.4 11/08/2022    AST 23 08/14/2024    ALT 31 08/14/2024    CHOL 276 (H)  08/14/2024    TRIG 242 (H) 08/14/2024     (H) 08/14/2024    HDL 42 08/14/2024     Lab Results   Component Value Date    TSH 0.074 (L) 09/03/2024    FREET4 1.84 (H) 09/03/2024    THYROIDAB <8 09/19/2022     THYROGLOBULIN WITH ANTI-TG W REFLEX (08/25/2023 13:39)    TSH+Free T4 (02/16/2024 12:50)    THYROGLOBULIN WITH ANTI-TG W REFLEX (08/25/2023 13:39)    TSH+Free T4 (03/20/2023 09:32)   Comprehensive Metabolic Panel (03/20/2023 09:32)   Comprehensive Thyroglobulin (03/20/2023 09:32)       ENDOCRINOLOGY - SCAN - THYROID GLAND RESECTION, ULP, 11/23/2022 (11/23/2022)     - THYROGLOBULIN WITH ANTI-TG W REFLEX (01/25/2023 09:59)   Comprehensive Thyroglobulin (12/09/2022 10:08)   Miscellaneous Test (11/23/2022 11:55)       Medication Review: Reviewed.       Current Outpatient Medications:     amLODIPine (NORVASC) 10 MG tablet, Take 1 tablet by mouth Daily., Disp: 30 tablet, Rfl: 12    levothyroxine (SYNTHROID, LEVOTHROID) 137 MCG tablet, TAKE 1 TABLET BY MOUTH EVERY DAY, Disp: 90 tablet, Rfl: 4    meloxicam (MOBIC) 15 MG tablet, Take 1 tablet by mouth Daily., Disp: 90 tablet, Rfl: 3          Assessment and plan:  Papillary thyroid cancer: This is a 62-year-old female who was initially found to have suspicious thyroid nodule, subsequently had thyroid surgery showed multifocal PTC with 3 out of 5 lymph nodes positive.  Had radioactive iodine treatment with 102 mCi in January 2023 followed by whole-body scan in February 2023 which did show an uptake in this neck.  TG level is declining, follow TG level.    Hypothyroidism: Uncontrolled with low TSH and high free T4, reduce levothyroxine to 125 mcg p.o. daily and follow labs.    Assessment and plan from February 23, 2024 reviewed and updated.               Hunter Wright MD FACE.

## 2024-09-10 NOTE — PATIENT INSTRUCTIONS
Decrease levothyroxine to 125 mcg p.o. daily  Check TSH, free T4, TG panel in 6 weeks  Check TSH and free T4 and CMP before follow-up in 6 months.

## 2024-09-11 DIAGNOSIS — R91.1 PULMONARY NODULE: Primary | ICD-10-CM

## 2024-09-11 NOTE — PROGRESS NOTES
Patient notified of test results. No questions. Patient verbalize understanding. She would see whom ever you recommend.

## 2024-09-12 PROBLEM — M85.80 OSTEOPENIA AFTER MENOPAUSE: Status: ACTIVE | Noted: 2024-09-12

## 2024-09-12 PROBLEM — Z78.0 OSTEOPENIA AFTER MENOPAUSE: Status: ACTIVE | Noted: 2024-09-12

## 2024-10-21 ENCOUNTER — TELEPHONE (OUTPATIENT)
Dept: ENDOCRINOLOGY | Facility: CLINIC | Age: 64
End: 2024-10-21

## 2024-10-21 NOTE — TELEPHONE ENCOUNTER
Hub staff attempted to follow warm transfer process and was unsuccessful     Caller: COOPER TROYHARRIETT    Relationship to patient: SELF    Best call back number: [phone]     Patient is needing: MS. RICHARD RECEIVED A CALL CANCELING HER LABS FOR 10/22/24 DUE TO NOT HAVING A TECH. PLEASE CONTACT MS. OSBORNE TO SCHEDULE.    OK TO CALL BACK ANYTIME. OK TO LEAVE .

## 2024-10-22 ENCOUNTER — LAB (OUTPATIENT)
Dept: LAB | Facility: HOSPITAL | Age: 64
End: 2024-10-22
Payer: MEDICARE

## 2024-10-22 DIAGNOSIS — E89.0 POSTOPERATIVE HYPOTHYROIDISM: ICD-10-CM

## 2024-10-22 DIAGNOSIS — C73 THYROID CANCER: ICD-10-CM

## 2024-10-22 LAB
ALBUMIN SERPL-MCNC: 4 G/DL (ref 3.5–5.2)
ALBUMIN/GLOB SERPL: 1.4 G/DL
ALP SERPL-CCNC: 96 U/L (ref 39–117)
ALT SERPL W P-5'-P-CCNC: 32 U/L (ref 1–33)
ANION GAP SERPL CALCULATED.3IONS-SCNC: 12 MMOL/L (ref 5–15)
AST SERPL-CCNC: 27 U/L (ref 1–32)
BILIRUB SERPL-MCNC: 0.4 MG/DL (ref 0–1.2)
BUN SERPL-MCNC: 14 MG/DL (ref 8–23)
BUN/CREAT SERPL: 20.9 (ref 7–25)
CALCIUM SPEC-SCNC: 9.4 MG/DL (ref 8.6–10.5)
CHLORIDE SERPL-SCNC: 106 MMOL/L (ref 98–107)
CO2 SERPL-SCNC: 23 MMOL/L (ref 22–29)
CREAT SERPL-MCNC: 0.67 MG/DL (ref 0.57–1)
EGFRCR SERPLBLD CKD-EPI 2021: 97.1 ML/MIN/1.73
GLOBULIN UR ELPH-MCNC: 2.9 GM/DL
GLUCOSE SERPL-MCNC: 102 MG/DL (ref 65–99)
POTASSIUM SERPL-SCNC: 4 MMOL/L (ref 3.5–5.2)
PROT SERPL-MCNC: 6.9 G/DL (ref 6–8.5)
SODIUM SERPL-SCNC: 141 MMOL/L (ref 136–145)
T4 FREE SERPL-MCNC: 1.63 NG/DL (ref 0.93–1.7)
TSH SERPL DL<=0.05 MIU/L-ACNC: 0.17 UIU/ML (ref 0.27–4.2)

## 2024-10-22 PROCEDURE — 86800 THYROGLOBULIN ANTIBODY: CPT

## 2024-10-22 PROCEDURE — 84443 ASSAY THYROID STIM HORMONE: CPT

## 2024-10-22 PROCEDURE — 80053 COMPREHEN METABOLIC PANEL: CPT

## 2024-10-22 PROCEDURE — 84439 ASSAY OF FREE THYROXINE: CPT

## 2024-10-22 PROCEDURE — 36415 COLL VENOUS BLD VENIPUNCTURE: CPT

## 2024-10-22 PROCEDURE — 84432 ASSAY OF THYROGLOBULIN: CPT

## 2024-10-27 DIAGNOSIS — E89.0 POSTOPERATIVE HYPOTHYROIDISM: Primary | ICD-10-CM

## 2024-10-27 NOTE — PROGRESS NOTES
Mild with free T4 normal, continue current dose of levothyroxine recheck TSH and free T4 before next follow-up.  TG undetectable.  Please notify patient.  Labs ordered.

## 2025-01-21 ENCOUNTER — OFFICE VISIT (OUTPATIENT)
Dept: FAMILY MEDICINE CLINIC | Facility: CLINIC | Age: 65
End: 2025-01-21
Payer: MEDICARE

## 2025-01-21 VITALS
HEART RATE: 84 BPM | SYSTOLIC BLOOD PRESSURE: 143 MMHG | RESPIRATION RATE: 18 BRPM | HEIGHT: 65 IN | TEMPERATURE: 97.7 F | DIASTOLIC BLOOD PRESSURE: 86 MMHG | BODY MASS INDEX: 34.99 KG/M2 | WEIGHT: 210 LBS | OXYGEN SATURATION: 99 %

## 2025-01-21 DIAGNOSIS — J01.00 ACUTE NON-RECURRENT MAXILLARY SINUSITIS: Primary | ICD-10-CM

## 2025-01-21 DIAGNOSIS — R59.0 LEFT CERVICAL LYMPHADENOPATHY: ICD-10-CM

## 2025-01-21 DIAGNOSIS — I10 PRIMARY HYPERTENSION: ICD-10-CM

## 2025-01-21 PROCEDURE — 1160F RVW MEDS BY RX/DR IN RCRD: CPT | Performed by: PHYSICIAN ASSISTANT

## 2025-01-21 PROCEDURE — 1125F AMNT PAIN NOTED PAIN PRSNT: CPT | Performed by: PHYSICIAN ASSISTANT

## 2025-01-21 PROCEDURE — 1159F MED LIST DOCD IN RCRD: CPT | Performed by: PHYSICIAN ASSISTANT

## 2025-01-21 PROCEDURE — 3079F DIAST BP 80-89 MM HG: CPT | Performed by: PHYSICIAN ASSISTANT

## 2025-01-21 PROCEDURE — G2211 COMPLEX E/M VISIT ADD ON: HCPCS | Performed by: PHYSICIAN ASSISTANT

## 2025-01-21 PROCEDURE — 99214 OFFICE O/P EST MOD 30 MIN: CPT | Performed by: PHYSICIAN ASSISTANT

## 2025-01-21 PROCEDURE — 3077F SYST BP >= 140 MM HG: CPT | Performed by: PHYSICIAN ASSISTANT

## 2025-01-21 RX ORDER — AMLODIPINE BESYLATE 5 MG/1
5 TABLET ORAL DAILY
Qty: 30 TABLET | Refills: 5 | Status: SHIPPED | OUTPATIENT
Start: 2025-01-21

## 2025-01-21 RX ORDER — METHYLPREDNISOLONE 4 MG/1
TABLET ORAL
Qty: 21 TABLET | Refills: 0 | Status: SHIPPED | OUTPATIENT
Start: 2025-01-21

## 2025-01-21 RX ORDER — DOXYCYCLINE 100 MG/1
100 CAPSULE ORAL 2 TIMES DAILY
Qty: 20 CAPSULE | Refills: 0 | Status: SHIPPED | OUTPATIENT
Start: 2025-01-21 | End: 2025-01-31

## 2025-01-21 NOTE — PROGRESS NOTES
Subjective   Rosalia Gamino is a 64 y.o. female.     History of Present Illness  Pt presents with bilateral earache.  Also complains of swelling in legs.  Earache   There is pain in both ears. This is a recurrent problem. The current episode started 1 to 4 weeks ago. The problem occurs intermittently. The problem has been improving. There has been no fever. The pain is at a severity of 5/10. Associated symptoms include coughing, drainage, headaches, hearing loss and rhinorrhea. Pertinent negatives include no abdominal pain, ear discharge, neck pain, rash or sore throat. She has tried acetaminophen and heat packs for the symptoms. The treatment provided moderate relief.          Past Medical History:   Diagnosis Date    Arthritis     Hypertension     Thyroid cancer 08/2022    Thyroidectomy 11/10/2022     Past Surgical History:   Procedure Laterality Date    ANKLE SURGERY      COLONOSCOPY      ENDOMETRIAL ABLATION      ROTATOR CUFF REPAIR Right     THYROIDECTOMY Bilateral      Family History   Problem Relation Age of Onset    Hypertension Mother     Diabetes Mother     Heart disease Mother     Hyperlipidemia Mother     Cancer Father         Prostate and ema    Arthritis Father     Stroke Brother      Social History     Socioeconomic History    Marital status:      Spouse name: Mu    Number of children: 4    Years of education: 12    Highest education level: Master's degree (e.g., MA, MS, Augustus, MEd, MSW, YESENIA)   Tobacco Use    Smoking status: Former     Current packs/day: 0.25     Average packs/day: 0.3 packs/day for 1.2 years (0.3 ttl pk-yrs)     Types: Cigarettes    Smokeless tobacco: Never    Tobacco comments:     At 18 for 3 months,  at 54 6 months   Vaping Use    Vaping status: Never Used   Substance and Sexual Activity    Alcohol use: Yes     Alcohol/week: 4.0 standard drinks of alcohol     Types: 2 Cans of beer, 2 Drinks containing 0.5 oz of alcohol per week    Drug use: Never    Sexual activity:  "Yes     Partners: Male     Birth control/protection: None         Current Outpatient Medications:     levothyroxine (SYNTHROID, LEVOTHROID) 125 MCG tablet, TAKE 1 TABLET BY MOUTH EVERY DAY, Disp: 90 tablet, Rfl: 2    meloxicam (MOBIC) 15 MG tablet, Take 1 tablet by mouth Daily., Disp: 90 tablet, Rfl: 3    amLODIPine (NORVASC) 5 MG tablet, Take 1 tablet by mouth Daily., Disp: 30 tablet, Rfl: 5    doxycycline (VIBRAMYCIN) 100 MG capsule, Take 1 capsule by mouth 2 (Two) Times a Day for 10 days., Disp: 20 capsule, Rfl: 0    methylPREDNISolone (MEDROL) 4 MG dose pack, Take as directed on package instructions., Disp: 21 tablet, Rfl: 0    Review of Systems   Constitutional:  Negative for activity change, appetite change, chills, diaphoresis, fatigue, fever, unexpected weight gain and unexpected weight loss.   HENT:  Positive for congestion, ear pain, hearing loss, rhinorrhea, sinus pressure, swollen glands and tinnitus. Negative for dental problem, drooling, ear discharge, facial swelling, mouth sores, nosebleeds and sore throat.    Respiratory:  Positive for cough. Negative for shortness of breath and wheezing.    Cardiovascular:  Positive for leg swelling. Negative for chest pain.   Gastrointestinal:  Negative for abdominal pain.   Musculoskeletal:  Negative for gait problem and neck pain.   Skin:  Negative for rash.   Neurological:  Positive for dizziness. Negative for tremors, syncope, weakness, light-headedness and headache.   Hematological:  Positive for adenopathy.     /86 (BP Location: Left arm, Patient Position: Sitting, Cuff Size: Large Adult)   Pulse 84   Temp 97.7 °F (36.5 °C) (Temporal)   Resp 18   Ht 165 cm (64.96\")   Wt 95.3 kg (210 lb)   SpO2 99%   BMI 34.99 kg/m²       Objective   Physical Exam  Vitals and nursing note reviewed.   Constitutional:       Appearance: Normal appearance.   HENT:      Head: Normocephalic and atraumatic.      Right Ear: Tympanic membrane, ear canal and external ear " normal.      Left Ear: Tympanic membrane, ear canal and external ear normal.      Nose: Nose normal. Congestion present.      Right Sinus: Maxillary sinus tenderness and frontal sinus tenderness present.      Left Sinus: Maxillary sinus tenderness and frontal sinus tenderness present.      Mouth/Throat:      Mouth: Mucous membranes are moist.      Pharynx: Oropharynx is clear.   Eyes:      Pupils: Pupils are equal, round, and reactive to light.   Cardiovascular:      Rate and Rhythm: Normal rate and regular rhythm.      Heart sounds: Normal heart sounds.   Pulmonary:      Effort: Pulmonary effort is normal.      Breath sounds: Normal breath sounds.   Musculoskeletal:         General: Normal range of motion.      Cervical back: Normal range of motion and neck supple. No rigidity.   Lymphadenopathy:      Cervical: No cervical adenopathy.      Left cervical: Superficial cervical adenopathy present.   Skin:     General: Skin is warm and dry.   Neurological:      General: No focal deficit present.      Mental Status: She is alert and oriented to person, place, and time.   Psychiatric:         Mood and Affect: Mood normal.         Behavior: Behavior normal.         Thought Content: Thought content normal.         Procedures     Assessment    Diagnoses and all orders for this visit:    1. Acute non-recurrent maxillary sinusitis (Primary)  Comments:  Start antibiotic and oral steroid.  Take oral antihistamine.  Let me know if not resolving.  Orders:  -     methylPREDNISolone (MEDROL) 4 MG dose pack; Take as directed on package instructions.  Dispense: 21 tablet; Refill: 0  -     doxycycline (VIBRAMYCIN) 100 MG capsule; Take 1 capsule by mouth 2 (Two) Times a Day for 10 days.  Dispense: 20 capsule; Refill: 0    2. Primary hypertension  Comments:  Since having some swelling in legs likely secondary to med, decrease amlodipine to 5mg and monitor blood pressure.  Goal <140/90.  Orders:  -     amLODIPine (NORVASC) 5 MG tablet;  Take 1 tablet by mouth Daily.  Dispense: 30 tablet; Refill: 5    3. Left cervical lymphadenopathy  Comments:  Likely secondary to sinus infection and drainage.  Will start on antibiotic and steroid.  If not resolving, let me know and I will order imaging.                   Answers submitted by the patient for this visit:  Primary Reason for Visit (Submitted on 1/19/2025)  What is the primary reason for your visit?: Ear Pain  Ear Pain Questionnaire (Submitted on 1/19/2025)  Chief Complaint: Ear pain

## 2025-04-15 ENCOUNTER — LAB (OUTPATIENT)
Dept: ENDOCRINOLOGY | Facility: CLINIC | Age: 65
End: 2025-04-15
Payer: MEDICARE

## 2025-04-22 ENCOUNTER — OFFICE VISIT (OUTPATIENT)
Dept: ENDOCRINOLOGY | Facility: CLINIC | Age: 65
End: 2025-04-22
Payer: MEDICARE

## 2025-04-22 ENCOUNTER — LAB (OUTPATIENT)
Dept: ENDOCRINOLOGY | Facility: CLINIC | Age: 65
End: 2025-04-22
Payer: MEDICARE

## 2025-04-22 VITALS
OXYGEN SATURATION: 98 % | HEART RATE: 94 BPM | DIASTOLIC BLOOD PRESSURE: 64 MMHG | BODY MASS INDEX: 34.66 KG/M2 | WEIGHT: 208 LBS | HEIGHT: 65 IN | SYSTOLIC BLOOD PRESSURE: 128 MMHG

## 2025-04-22 DIAGNOSIS — C73 THYROID CANCER: Primary | ICD-10-CM

## 2025-04-22 DIAGNOSIS — E89.0 POSTOPERATIVE HYPOTHYROIDISM: ICD-10-CM

## 2025-04-22 DIAGNOSIS — C73 THYROID CANCER: ICD-10-CM

## 2025-04-22 PROCEDURE — 1159F MED LIST DOCD IN RCRD: CPT | Performed by: INTERNAL MEDICINE

## 2025-04-22 PROCEDURE — 99214 OFFICE O/P EST MOD 30 MIN: CPT | Performed by: INTERNAL MEDICINE

## 2025-04-22 PROCEDURE — 3078F DIAST BP <80 MM HG: CPT | Performed by: INTERNAL MEDICINE

## 2025-04-22 PROCEDURE — 3074F SYST BP LT 130 MM HG: CPT | Performed by: INTERNAL MEDICINE

## 2025-04-22 PROCEDURE — 1160F RVW MEDS BY RX/DR IN RCRD: CPT | Performed by: INTERNAL MEDICINE

## 2025-04-22 PROCEDURE — G2211 COMPLEX E/M VISIT ADD ON: HCPCS | Performed by: INTERNAL MEDICINE

## 2025-04-22 RX ORDER — CELECOXIB 200 MG/1
1 CAPSULE ORAL DAILY
COMMUNITY
Start: 2025-04-08

## 2025-04-22 NOTE — PATIENT INSTRUCTIONS
Continue levothyroxine at 125 mcg p.o. daily  Check CMP and thyroglobulin panel today  Check TSH, free T4, CMP and thyroglobulin panel before follow-up in 6 months.

## 2025-04-22 NOTE — PROGRESS NOTES
Endocrine Progress Note Outpatient     Patient Care Team:  Karen Glynn MD as PCP - General  Neyda Luz MD as Consulting Physician (Surgical Oncology)  Miguel Angel Anaya MD as Consulting Physician (Radiation Oncology)  Ede Bhatti MD as Consulting Physician (Obstetrics and Gynecology)    Chief Complaint: Follow-up thyroid nodule/thyroid cancer    HPI: This is a 65-year-old female with prior history of hypertension was seen here back in September for multiple thyroid nodules and they were suspicious looking.  She subsequently had biopsy followed by total thyroidectomy which was done on November 10, 2022 at [facility] with Dr. Neyda Luz.  Postsurgical path did show multifocal PTC with 3 out of 5 lymph nodes positive.  She is currently on levothyroxine 125 mcg p.o. daily.    I reviewed her pathology report: She did have multifocal papillary thyroid cancer in the thyroid with a largest foci was 3.1 cm and the second largest was 1.7 cm.  She did have classical papillary thyroid cancer on the largest 1 and to have 3 out of 5 lymph nodes positive.      Underwent radioactive iodine treatment on January 25, 2023 with 102 mCi of I-131 followed by whole-body scan on February 1, 2023 which did show a uptake in the neck area as expected but there was no uptake anywhere else in the body according to that scan.    Past Medical History:   Diagnosis Date    Arthritis     Hypertension     Thyroid cancer 08/2022    Thyroidectomy 11/10/2022       Social History     Socioeconomic History    Marital status:      Spouse name: Mu    Number of children: 4    Years of education: 12    Highest education level: Master's degree (e.g., MA, MS, Augustus, MEd, MSW, YESENIA)   Tobacco Use    Smoking status: Former     Current packs/day: 0.25     Average packs/day: 0.3 packs/day for 1.2 years (0.3 ttl pk-yrs)     Types: Cigarettes    Smokeless tobacco: Never    Tobacco comments:     At 18 for 3 months,   at 54 6 months   Vaping Use    Vaping status: Never Used   Substance and Sexual Activity    Alcohol use: Yes     Alcohol/week: 4.0 standard drinks of alcohol     Types: 2 Cans of beer, 2 Drinks containing 0.5 oz of alcohol per week    Drug use: Never    Sexual activity: Yes     Partners: Male     Birth control/protection: None       Family History   Problem Relation Age of Onset    Hypertension Mother     Diabetes Mother     Heart disease Mother     Hyperlipidemia Mother     Cancer Father         Prostate and ema    Arthritis Father     Stroke Brother        Allergies   Allergen Reactions    Penicillins Hives       ROS:   Constitutional:  Denies fatigue, tiredness.    Eyes:  Denies change in visual acuity   HENT:  Denies nasal congestion or sore throat   Respiratory: denies cough, shortness of breath.   Cardiovascular:  denies chest pain, edema   GI:  Denies abdominal pain, nausea, vomiting.   Musculoskeletal:  Denies back pain or joint pain   Integument:  Denies dry skin and rash   Neurologic:  Denies headache, focal weakness or sensory changes   Endocrine:  Denies polyuria or polydipsia   Psychiatric:  Denies depression or anxiety      Vitals:    04/22/25 0848   BP: 128/64   Pulse: 94   SpO2: 98%     Body mass index is 34.66 kg/m².     Physical Exam:  GEN: NAD, conversant  EYES: EOMI,   NECK: no thyromegaly,   CV: RRR,   LUNG: CTA  PSYCH: AOX3, appropriate mood, affect normal      Results Review:     I reviewed the patient's new clinical results.    Lab Results   Component Value Date    HGBA1C 5.4 05/03/2022    HGBA1C 5.2 11/06/2019      Lab Results   Component Value Date    GLUCOSE 102 (H) 10/22/2024    BUN 14 10/22/2024    CREATININE 0.67 10/22/2024    EGFRIFNONA 94 09/22/2020    EGFRIFAFRI >60 11/08/2022    BCR 20.9 10/22/2024    K 4.0 10/22/2024    CO2 23.0 10/22/2024    CALCIUM 9.4 10/22/2024    ALBUMIN 4.0 10/22/2024    LABIL2 1.4 11/08/2022    AST 27 10/22/2024    ALT 32 10/22/2024    CHOL 276 (H)  08/14/2024    TRIG 242 (H) 08/14/2024     (H) 08/14/2024    HDL 42 08/14/2024     Lab Results   Component Value Date    TSH 1.120 04/15/2025    FREET4 1.57 04/15/2025    THYROIDAB <8 09/19/2022     Comprehensive Thyroglobulin (10/22/2024 11:03)      ENDOCRINOLOGY - SCAN - THYROID GLAND RESECTION, ULP, 11/23/2022 (11/23/2022)     - THYROGLOBULIN WITH ANTI-TG W REFLEX (01/25/2023 09:59)   Comprehensive Thyroglobulin (12/09/2022 10:08)   Miscellaneous Test (11/23/2022 11:55)       Medication Review: Reviewed.       Current Outpatient Medications:     celecoxib (CeleBREX) 200 MG capsule, Take 1 capsule by mouth Daily., Disp: , Rfl:     levothyroxine (SYNTHROID, LEVOTHROID) 125 MCG tablet, TAKE 1 TABLET BY MOUTH EVERY DAY, Disp: 90 tablet, Rfl: 2          Assessment and plan:  Papillary thyroid cancer: This is a 62-year-old female who was initially found to have suspicious thyroid nodule, subsequently had thyroid surgery showed multifocal PTC with 3 out of 5 lymph nodes positive.  Had radioactive iodine treatment with 102 mCi in January 2023 followed by whole-body scan in February 2023 which did show an uptake in this neck.  Check TG panel    Hypothyroidism: Euthyroid with normal TSH and free T4, continue current dose of levothyroxine at 125 mcg p.o. daily.    Assessment and plan from September 10, 2024 reviewed and updated               Hunter Wright MD FACE.

## 2025-05-01 ENCOUNTER — RESULTS FOLLOW-UP (OUTPATIENT)
Dept: ENDOCRINOLOGY | Facility: CLINIC | Age: 65
End: 2025-05-01
Payer: MEDICARE

## 2025-05-01 DIAGNOSIS — C73 THYROID CANCER: Primary | ICD-10-CM

## 2025-05-01 LAB
ALBUMIN SERPL-MCNC: 4.1 G/DL (ref 3.9–4.9)
ALP SERPL-CCNC: 90 IU/L (ref 44–121)
ALT SERPL-CCNC: 30 IU/L (ref 0–32)
AST SERPL-CCNC: 20 IU/L (ref 0–40)
BILIRUB SERPL-MCNC: 0.3 MG/DL (ref 0–1.2)
BUN SERPL-MCNC: 13 MG/DL (ref 8–27)
BUN/CREAT SERPL: 20 (ref 12–28)
CALCIUM SERPL-MCNC: 9.6 MG/DL (ref 8.7–10.3)
CHLORIDE SERPL-SCNC: 106 MMOL/L (ref 96–106)
CO2 SERPL-SCNC: 22 MMOL/L (ref 20–29)
CREAT SERPL-MCNC: 0.66 MG/DL (ref 0.57–1)
EGFRCR SERPLBLD CKD-EPI 2021: 97 ML/MIN/1.73
GLOBULIN SER CALC-MCNC: 2.5 G/DL (ref 1.5–4.5)
GLUCOSE SERPL-MCNC: 100 MG/DL (ref 70–99)
POTASSIUM SERPL-SCNC: 4.3 MMOL/L (ref 3.5–5.2)
PROT SERPL-MCNC: 6.6 G/DL (ref 6–8.5)
SODIUM SERPL-SCNC: 142 MMOL/L (ref 134–144)
THYROGLOB AB SERPL-ACNC: <1 IU/ML
THYROGLOB SERPL-MCNC: 0.1 NG/ML
THYROGLOB SERPL-MCNC: NORMAL NG/ML

## 2025-05-01 NOTE — PROGRESS NOTES
Thyroglobulin 0.1, negative thyroglobulin antibodies.  Please recheck TG panel in 3 months.  Order placed.  Please notify patient.

## 2025-06-06 RX ORDER — LEVOTHYROXINE SODIUM 125 UG/1
125 TABLET ORAL DAILY
Qty: 90 TABLET | Refills: 0 | Status: SHIPPED | OUTPATIENT
Start: 2025-06-06

## 2025-07-22 ENCOUNTER — LAB (OUTPATIENT)
Dept: ENDOCRINOLOGY | Facility: CLINIC | Age: 65
End: 2025-07-22
Payer: MEDICARE

## 2025-07-22 DIAGNOSIS — C73 THYROID CANCER: ICD-10-CM

## 2025-07-22 DIAGNOSIS — E89.0 POSTOPERATIVE HYPOTHYROIDISM: ICD-10-CM

## 2025-07-26 LAB
ALBUMIN SERPL-MCNC: 4.2 G/DL (ref 3.9–4.9)
ALP SERPL-CCNC: 98 IU/L (ref 44–121)
ALT SERPL-CCNC: 34 IU/L (ref 0–32)
AST SERPL-CCNC: 24 IU/L (ref 0–40)
BILIRUB SERPL-MCNC: 0.4 MG/DL (ref 0–1.2)
BUN SERPL-MCNC: 17 MG/DL (ref 8–27)
BUN/CREAT SERPL: 26 (ref 12–28)
CALCIUM SERPL-MCNC: 9.7 MG/DL (ref 8.7–10.3)
CHLORIDE SERPL-SCNC: 105 MMOL/L (ref 96–106)
CO2 SERPL-SCNC: 20 MMOL/L (ref 20–29)
CREAT SERPL-MCNC: 0.66 MG/DL (ref 0.57–1)
EGFRCR SERPLBLD CKD-EPI 2021: 97 ML/MIN/1.73
GLOBULIN SER CALC-MCNC: 2.6 G/DL (ref 1.5–4.5)
GLUCOSE SERPL-MCNC: 139 MG/DL (ref 70–99)
POTASSIUM SERPL-SCNC: 4.5 MMOL/L (ref 3.5–5.2)
PROT SERPL-MCNC: 6.8 G/DL (ref 6–8.5)
SODIUM SERPL-SCNC: 140 MMOL/L (ref 134–144)
T4 FREE SERPL-MCNC: 1.57 NG/DL (ref 0.82–1.77)
THYROGLOB AB SERPL-ACNC: <1 IU/ML
THYROGLOB SERPL-MCNC: <0.1 NG/ML
THYROGLOB SERPL-MCNC: NORMAL NG/ML
TSH SERPL DL<=0.005 MIU/L-ACNC: 0.23 UIU/ML (ref 0.45–4.5)

## 2025-07-28 DIAGNOSIS — C73 THYROID CANCER: Primary | ICD-10-CM

## 2025-07-28 DIAGNOSIS — E89.0 POSTOPERATIVE HYPOTHYROIDISM: ICD-10-CM

## 2025-08-11 ENCOUNTER — TELEPHONE (OUTPATIENT)
Dept: ENDOCRINOLOGY | Facility: CLINIC | Age: 65
End: 2025-08-11
Payer: MEDICARE